# Patient Record
Sex: MALE | Race: WHITE | Employment: OTHER | URBAN - METROPOLITAN AREA
[De-identification: names, ages, dates, MRNs, and addresses within clinical notes are randomized per-mention and may not be internally consistent; named-entity substitution may affect disease eponyms.]

---

## 2021-07-20 ENCOUNTER — HOSPITAL ENCOUNTER (EMERGENCY)
Age: 86
Discharge: LEFT BEFORE DISCHARGE | End: 2021-07-20

## 2021-07-20 PROCEDURE — 75810000275 HC EMERGENCY DEPT VISIT NO LEVEL OF CARE

## 2022-04-12 ENCOUNTER — HOSPITAL ENCOUNTER (INPATIENT)
Age: 87
LOS: 3 days | Discharge: NURSING, REHAB OR LONG-TERM CARE | DRG: 177 | End: 2022-04-16
Attending: EMERGENCY MEDICINE | Admitting: HOSPITALIST
Payer: MEDICARE

## 2022-04-12 ENCOUNTER — APPOINTMENT (OUTPATIENT)
Dept: CT IMAGING | Age: 87
DRG: 177 | End: 2022-04-12
Attending: EMERGENCY MEDICINE
Payer: MEDICARE

## 2022-04-12 ENCOUNTER — APPOINTMENT (OUTPATIENT)
Dept: GENERAL RADIOLOGY | Age: 87
DRG: 177 | End: 2022-04-12
Attending: PHYSICIAN ASSISTANT
Payer: MEDICARE

## 2022-04-12 DIAGNOSIS — D72.829 LEUKOCYTOSIS, UNSPECIFIED TYPE: ICD-10-CM

## 2022-04-12 DIAGNOSIS — R06.00 DYSPNEA, UNSPECIFIED TYPE: ICD-10-CM

## 2022-04-12 DIAGNOSIS — Z51.5 ENCOUNTER FOR PALLIATIVE CARE: ICD-10-CM

## 2022-04-12 DIAGNOSIS — J90 PLEURAL EFFUSION: ICD-10-CM

## 2022-04-12 DIAGNOSIS — R53.83 FATIGUE, UNSPECIFIED TYPE: ICD-10-CM

## 2022-04-12 DIAGNOSIS — C34.92 ADENOCARCINOMA OF LEFT LUNG, STAGE 3 (HCC): ICD-10-CM

## 2022-04-12 DIAGNOSIS — R54 FRAILTY: ICD-10-CM

## 2022-04-12 DIAGNOSIS — J44.9 CHRONIC OBSTRUCTIVE PULMONARY DISEASE, UNSPECIFIED COPD TYPE (HCC): ICD-10-CM

## 2022-04-12 DIAGNOSIS — R09.02 HYPOXIA: Primary | ICD-10-CM

## 2022-04-12 DIAGNOSIS — J15.6 PNEUMONIA DUE TO GRAM-NEGATIVE BACTERIA (HCC): ICD-10-CM

## 2022-04-12 DIAGNOSIS — E86.0 DEHYDRATION: ICD-10-CM

## 2022-04-12 DIAGNOSIS — J44.9 COPD (CHRONIC OBSTRUCTIVE PULMONARY DISEASE) (HCC): ICD-10-CM

## 2022-04-12 LAB
ALBUMIN SERPL-MCNC: 2.3 G/DL (ref 3.2–4.6)
ALBUMIN/GLOB SERPL: 0.4 {RATIO} (ref 1.2–3.5)
ALP SERPL-CCNC: 91 U/L (ref 50–136)
ALT SERPL-CCNC: 61 U/L (ref 12–65)
ANION GAP SERPL CALC-SCNC: 7 MMOL/L (ref 7–16)
AST SERPL-CCNC: 19 U/L (ref 15–37)
ATRIAL RATE: 96 BPM
BASOPHILS # BLD: 0 K/UL (ref 0–0.2)
BASOPHILS NFR BLD: 0 % (ref 0–2)
BILIRUB SERPL-MCNC: 0.8 MG/DL (ref 0.2–1.1)
BUN SERPL-MCNC: 28 MG/DL (ref 8–23)
CALCIUM SERPL-MCNC: 10.5 MG/DL (ref 8.3–10.4)
CALCULATED P AXIS, ECG09: 97 DEGREES
CALCULATED R AXIS, ECG10: -61 DEGREES
CALCULATED T AXIS, ECG11: 93 DEGREES
CHLORIDE SERPL-SCNC: 102 MMOL/L (ref 98–107)
CO2 SERPL-SCNC: 32 MMOL/L (ref 21–32)
CREAT SERPL-MCNC: 0.9 MG/DL (ref 0.8–1.5)
DIAGNOSIS, 93000: NORMAL
DIFFERENTIAL METHOD BLD: ABNORMAL
EOSINOPHIL # BLD: 0 K/UL (ref 0–0.8)
EOSINOPHIL NFR BLD: 0 % (ref 0.5–7.8)
ERYTHROCYTE [DISTWIDTH] IN BLOOD BY AUTOMATED COUNT: 16.7 % (ref 11.9–14.6)
GLOBULIN SER CALC-MCNC: 5.4 G/DL (ref 2.3–3.5)
GLUCOSE SERPL-MCNC: 122 MG/DL (ref 65–100)
HCT VFR BLD AUTO: 41.8 % (ref 41.1–50.3)
HGB BLD-MCNC: 12.4 G/DL (ref 13.6–17.2)
IMM GRANULOCYTES # BLD AUTO: 0.1 K/UL (ref 0–0.5)
IMM GRANULOCYTES NFR BLD AUTO: 1 % (ref 0–5)
LACTATE SERPL-SCNC: 1.6 MMOL/L (ref 0.4–2)
LYMPHOCYTES # BLD: 0.4 K/UL (ref 0.5–4.6)
LYMPHOCYTES NFR BLD: 3 % (ref 13–44)
MAGNESIUM SERPL-MCNC: 2.3 MG/DL (ref 1.8–2.4)
MCH RBC QN AUTO: 25.2 PG (ref 26.1–32.9)
MCHC RBC AUTO-ENTMCNC: 29.7 G/DL (ref 31.4–35)
MCV RBC AUTO: 84.8 FL (ref 79.6–97.8)
MONOCYTES # BLD: 0.9 K/UL (ref 0.1–1.3)
MONOCYTES NFR BLD: 6 % (ref 4–12)
NEUTS SEG # BLD: 14.2 K/UL (ref 1.7–8.2)
NEUTS SEG NFR BLD: 91 % (ref 43–78)
NRBC # BLD: 0 K/UL (ref 0–0.2)
P-R INTERVAL, ECG05: 119 MS
PLATELET # BLD AUTO: 370 K/UL (ref 150–450)
PMV BLD AUTO: 9.9 FL (ref 9.4–12.3)
POTASSIUM SERPL-SCNC: 4.3 MMOL/L (ref 3.5–5.1)
PROT SERPL-MCNC: 7.7 G/DL (ref 6.3–8.2)
Q-T INTERVAL, ECG07: 338 MS
QRS DURATION, ECG06: 83 MS
QTC CALCULATION (BEZET), ECG08: 428 MS
RBC # BLD AUTO: 4.93 M/UL (ref 4.23–5.6)
SODIUM SERPL-SCNC: 141 MMOL/L (ref 136–145)
TROPONIN-HIGH SENSITIVITY: 14.9 PG/ML (ref 0–14)
TROPONIN-HIGH SENSITIVITY: 17.1 PG/ML (ref 0–14)
VENTRICULAR RATE, ECG03: 96 BPM
WBC # BLD AUTO: 15.7 K/UL (ref 4.3–11.1)

## 2022-04-12 PROCEDURE — 93005 ELECTROCARDIOGRAM TRACING: CPT | Performed by: EMERGENCY MEDICINE

## 2022-04-12 PROCEDURE — 96367 TX/PROPH/DG ADDL SEQ IV INF: CPT

## 2022-04-12 PROCEDURE — 71045 X-RAY EXAM CHEST 1 VIEW: CPT

## 2022-04-12 PROCEDURE — 74011000636 HC RX REV CODE- 636: Performed by: EMERGENCY MEDICINE

## 2022-04-12 PROCEDURE — 87040 BLOOD CULTURE FOR BACTERIA: CPT

## 2022-04-12 PROCEDURE — 96365 THER/PROPH/DIAG IV INF INIT: CPT

## 2022-04-12 PROCEDURE — 74011000250 HC RX REV CODE- 250: Performed by: EMERGENCY MEDICINE

## 2022-04-12 PROCEDURE — 99285 EMERGENCY DEPT VISIT HI MDM: CPT

## 2022-04-12 PROCEDURE — 85025 COMPLETE CBC W/AUTO DIFF WBC: CPT

## 2022-04-12 PROCEDURE — 83735 ASSAY OF MAGNESIUM: CPT

## 2022-04-12 PROCEDURE — 71260 CT THORAX DX C+: CPT

## 2022-04-12 PROCEDURE — 94762 N-INVAS EAR/PLS OXIMTRY CONT: CPT

## 2022-04-12 PROCEDURE — 83605 ASSAY OF LACTIC ACID: CPT

## 2022-04-12 PROCEDURE — 84484 ASSAY OF TROPONIN QUANT: CPT

## 2022-04-12 PROCEDURE — 74011250636 HC RX REV CODE- 250/636: Performed by: EMERGENCY MEDICINE

## 2022-04-12 PROCEDURE — 74011000258 HC RX REV CODE- 258: Performed by: EMERGENCY MEDICINE

## 2022-04-12 PROCEDURE — 80053 COMPREHEN METABOLIC PANEL: CPT

## 2022-04-12 RX ORDER — SODIUM CHLORIDE 0.9 % (FLUSH) 0.9 %
5-10 SYRINGE (ML) INJECTION EVERY 8 HOURS
Status: DISCONTINUED | OUTPATIENT
Start: 2022-04-12 | End: 2022-04-16 | Stop reason: HOSPADM

## 2022-04-12 RX ORDER — SODIUM CHLORIDE 0.9 % (FLUSH) 0.9 %
5-10 SYRINGE (ML) INJECTION AS NEEDED
Status: DISCONTINUED | OUTPATIENT
Start: 2022-04-12 | End: 2022-04-16 | Stop reason: HOSPADM

## 2022-04-12 RX ORDER — SODIUM CHLORIDE 0.9 % (FLUSH) 0.9 %
10 SYRINGE (ML) INJECTION
Status: COMPLETED | OUTPATIENT
Start: 2022-04-12 | End: 2022-04-12

## 2022-04-12 RX ADMIN — Medication 10 ML: at 21:31

## 2022-04-12 RX ADMIN — CEFTRIAXONE 2 G: 2 INJECTION, POWDER, FOR SOLUTION INTRAMUSCULAR; INTRAVENOUS at 23:05

## 2022-04-12 RX ADMIN — AZITHROMYCIN MONOHYDRATE 500 MG: 500 INJECTION, POWDER, LYOPHILIZED, FOR SOLUTION INTRAVENOUS at 23:53

## 2022-04-12 RX ADMIN — SODIUM CHLORIDE 100 ML: 9 INJECTION, SOLUTION INTRAVENOUS at 21:31

## 2022-04-12 RX ADMIN — SODIUM CHLORIDE 250 ML/HR: 900 INJECTION, SOLUTION INTRAVENOUS at 23:09

## 2022-04-12 RX ADMIN — IOPAMIDOL 100 ML: 755 INJECTION, SOLUTION INTRAVENOUS at 21:31

## 2022-04-12 RX ADMIN — SODIUM CHLORIDE, PRESERVATIVE FREE 10 ML: 5 INJECTION INTRAVENOUS at 22:00

## 2022-04-12 NOTE — ED NOTES
Patient is a 59-year-old male with history of lung cancer chronically on 3 L who presents with worsening shortness of breath for 2 weeks. In triage oxygen saturation 85% on 6 L. Mild increased work of breathing. I do not hear any breath sounds on the left. Decreased breath sounds on right. Patient evaluated initially in triage. Rapid Medical Evaluation was conducted and necessary orders have been placed. I have performed a medical screening exam.  Care will now be transferred to the provider in the back of the emergency department.   JAVIER Bedolla 3:19 PM

## 2022-04-13 PROBLEM — J90 PLEURAL EFFUSION: Status: ACTIVE | Noted: 2022-04-13

## 2022-04-13 PROBLEM — J15.6 PNEUMONIA DUE TO GRAM-NEGATIVE BACTERIA (HCC): Status: ACTIVE | Noted: 2022-04-13

## 2022-04-13 PROBLEM — E86.0 DEHYDRATION: Status: ACTIVE | Noted: 2022-04-13

## 2022-04-13 PROBLEM — R53.1 WEAKNESS GENERALIZED: Status: ACTIVE | Noted: 2022-04-13

## 2022-04-13 PROBLEM — J18.9 PNEUMONIA: Status: ACTIVE | Noted: 2022-04-13

## 2022-04-13 PROBLEM — C34.92 ADENOCARCINOMA OF LEFT LUNG, STAGE 3 (HCC): Status: ACTIVE | Noted: 2022-04-13

## 2022-04-13 PROCEDURE — 74011250637 HC RX REV CODE- 250/637: Performed by: HOSPITALIST

## 2022-04-13 PROCEDURE — 94762 N-INVAS EAR/PLS OXIMTRY CONT: CPT

## 2022-04-13 PROCEDURE — 74011000250 HC RX REV CODE- 250: Performed by: HOSPITALIST

## 2022-04-13 PROCEDURE — 97165 OT EVAL LOW COMPLEX 30 MIN: CPT

## 2022-04-13 PROCEDURE — 97535 SELF CARE MNGMENT TRAINING: CPT

## 2022-04-13 PROCEDURE — 97530 THERAPEUTIC ACTIVITIES: CPT

## 2022-04-13 PROCEDURE — 92610 EVALUATE SWALLOWING FUNCTION: CPT

## 2022-04-13 PROCEDURE — 65270000029 HC RM PRIVATE

## 2022-04-13 PROCEDURE — 99223 1ST HOSP IP/OBS HIGH 75: CPT | Performed by: INTERNAL MEDICINE

## 2022-04-13 PROCEDURE — 77010033678 HC OXYGEN DAILY

## 2022-04-13 PROCEDURE — 97162 PT EVAL MOD COMPLEX 30 MIN: CPT

## 2022-04-13 PROCEDURE — 76450000000

## 2022-04-13 PROCEDURE — 94640 AIRWAY INHALATION TREATMENT: CPT

## 2022-04-13 PROCEDURE — 86580 TB INTRADERMAL TEST: CPT | Performed by: HOSPITALIST

## 2022-04-13 PROCEDURE — 74011000258 HC RX REV CODE- 258: Performed by: HOSPITALIST

## 2022-04-13 PROCEDURE — 74011000250 HC RX REV CODE- 250: Performed by: EMERGENCY MEDICINE

## 2022-04-13 PROCEDURE — 74011250636 HC RX REV CODE- 250/636: Performed by: HOSPITALIST

## 2022-04-13 PROCEDURE — 99222 1ST HOSP IP/OBS MODERATE 55: CPT | Performed by: INTERNAL MEDICINE

## 2022-04-13 PROCEDURE — 94664 DEMO&/EVAL PT USE INHALER: CPT

## 2022-04-13 PROCEDURE — 74011000250 HC RX REV CODE- 250: Performed by: INTERNAL MEDICINE

## 2022-04-13 RX ORDER — SODIUM CHLORIDE 9 MG/ML
75 INJECTION, SOLUTION INTRAVENOUS CONTINUOUS
Status: DISCONTINUED | OUTPATIENT
Start: 2022-04-13 | End: 2022-04-15

## 2022-04-13 RX ORDER — ONDANSETRON 2 MG/ML
4 INJECTION INTRAMUSCULAR; INTRAVENOUS
Status: DISCONTINUED | OUTPATIENT
Start: 2022-04-13 | End: 2022-04-16 | Stop reason: HOSPADM

## 2022-04-13 RX ORDER — ONDANSETRON 4 MG/1
4 TABLET, ORALLY DISINTEGRATING ORAL
Status: DISCONTINUED | OUTPATIENT
Start: 2022-04-13 | End: 2022-04-16 | Stop reason: HOSPADM

## 2022-04-13 RX ORDER — TAMSULOSIN HYDROCHLORIDE 0.4 MG/1
0.4 CAPSULE ORAL DAILY
Status: DISCONTINUED | OUTPATIENT
Start: 2022-04-13 | End: 2022-04-16 | Stop reason: HOSPADM

## 2022-04-13 RX ORDER — ACETAMINOPHEN 650 MG/1
650 SUPPOSITORY RECTAL
Status: DISCONTINUED | OUTPATIENT
Start: 2022-04-13 | End: 2022-04-16 | Stop reason: HOSPADM

## 2022-04-13 RX ORDER — ALBUTEROL SULFATE 0.83 MG/ML
2.5 SOLUTION RESPIRATORY (INHALATION)
Status: DISCONTINUED | OUTPATIENT
Start: 2022-04-13 | End: 2022-04-16 | Stop reason: HOSPADM

## 2022-04-13 RX ORDER — LEVALBUTEROL INHALATION SOLUTION 0.63 MG/3ML
0.63 SOLUTION RESPIRATORY (INHALATION)
Status: DISCONTINUED | OUTPATIENT
Start: 2022-04-13 | End: 2022-04-16 | Stop reason: HOSPADM

## 2022-04-13 RX ORDER — SODIUM CHLORIDE FOR INHALATION 3 %
4 VIAL, NEBULIZER (ML) INHALATION
Status: DISCONTINUED | OUTPATIENT
Start: 2022-04-13 | End: 2022-04-16 | Stop reason: HOSPADM

## 2022-04-13 RX ORDER — ALFUZOSIN HYDROCHLORIDE 10 MG/1
TABLET, EXTENDED RELEASE ORAL DAILY
COMMUNITY

## 2022-04-13 RX ORDER — MONTELUKAST SODIUM 10 MG/1
10 TABLET ORAL DAILY
COMMUNITY

## 2022-04-13 RX ORDER — IPRATROPIUM BROMIDE AND ALBUTEROL SULFATE 2.5; .5 MG/3ML; MG/3ML
3 SOLUTION RESPIRATORY (INHALATION)
Status: DISCONTINUED | OUTPATIENT
Start: 2022-04-13 | End: 2022-04-13

## 2022-04-13 RX ORDER — ENOXAPARIN SODIUM 100 MG/ML
40 INJECTION SUBCUTANEOUS DAILY
Status: DISCONTINUED | OUTPATIENT
Start: 2022-04-13 | End: 2022-04-16 | Stop reason: HOSPADM

## 2022-04-13 RX ORDER — PREDNISONE 10 MG/1
40 TABLET ORAL
COMMUNITY
End: 2022-04-16

## 2022-04-13 RX ORDER — PANTOPRAZOLE SODIUM 40 MG/1
40 TABLET, DELAYED RELEASE ORAL DAILY
Status: DISCONTINUED | OUTPATIENT
Start: 2022-04-13 | End: 2022-04-16 | Stop reason: HOSPADM

## 2022-04-13 RX ORDER — POLYETHYLENE GLYCOL 3350 17 G/17G
17 POWDER, FOR SOLUTION ORAL DAILY PRN
Status: DISCONTINUED | OUTPATIENT
Start: 2022-04-13 | End: 2022-04-16 | Stop reason: HOSPADM

## 2022-04-13 RX ORDER — ACETAMINOPHEN 325 MG/1
650 TABLET ORAL
Status: DISCONTINUED | OUTPATIENT
Start: 2022-04-13 | End: 2022-04-16 | Stop reason: HOSPADM

## 2022-04-13 RX ORDER — ASPIRIN 81 MG/1
81 TABLET ORAL DAILY
Status: DISCONTINUED | OUTPATIENT
Start: 2022-04-13 | End: 2022-04-16 | Stop reason: HOSPADM

## 2022-04-13 RX ADMIN — IPRATROPIUM BROMIDE AND ALBUTEROL SULFATE 3 ML: .5; 3 SOLUTION RESPIRATORY (INHALATION) at 07:29

## 2022-04-13 RX ADMIN — SODIUM CHLORIDE SOLN NEBU 3% 4 ML: 3 NEBU SOLN at 20:42

## 2022-04-13 RX ADMIN — PANTOPRAZOLE SODIUM 40 MG: 40 TABLET, DELAYED RELEASE ORAL at 08:57

## 2022-04-13 RX ADMIN — LEVALBUTEROL HYDROCHLORIDE 0.63 MG: 0.63 SOLUTION RESPIRATORY (INHALATION) at 20:42

## 2022-04-13 RX ADMIN — SODIUM CHLORIDE, PRESERVATIVE FREE 10 ML: 5 INJECTION INTRAVENOUS at 22:05

## 2022-04-13 RX ADMIN — LEVALBUTEROL HYDROCHLORIDE 0.63 MG: 0.63 SOLUTION RESPIRATORY (INHALATION) at 15:28

## 2022-04-13 RX ADMIN — PIPERACILLIN AND TAZOBACTAM 3.38 G: 3; .375 INJECTION, POWDER, LYOPHILIZED, FOR SOLUTION INTRAVENOUS at 22:04

## 2022-04-13 RX ADMIN — SODIUM CHLORIDE 75 ML/HR: 900 INJECTION, SOLUTION INTRAVENOUS at 06:31

## 2022-04-13 RX ADMIN — ENOXAPARIN SODIUM 40 MG: 40 INJECTION SUBCUTANEOUS at 08:57

## 2022-04-13 RX ADMIN — ASPIRIN 81 MG: 81 TABLET ORAL at 08:57

## 2022-04-13 RX ADMIN — SODIUM CHLORIDE 75 ML/HR: 900 INJECTION, SOLUTION INTRAVENOUS at 22:11

## 2022-04-13 RX ADMIN — PIPERACILLIN AND TAZOBACTAM 3.38 G: 3; .375 INJECTION, POWDER, LYOPHILIZED, FOR SOLUTION INTRAVENOUS at 05:22

## 2022-04-13 RX ADMIN — TAMSULOSIN HYDROCHLORIDE 0.4 MG: 0.4 CAPSULE ORAL at 08:57

## 2022-04-13 RX ADMIN — SODIUM CHLORIDE, PRESERVATIVE FREE 5 ML: 5 INJECTION INTRAVENOUS at 12:22

## 2022-04-13 RX ADMIN — PIPERACILLIN AND TAZOBACTAM 3.38 G: 3; .375 INJECTION, POWDER, LYOPHILIZED, FOR SOLUTION INTRAVENOUS at 12:18

## 2022-04-13 RX ADMIN — Medication 5 UNITS: at 08:57

## 2022-04-13 RX ADMIN — SODIUM CHLORIDE, PRESERVATIVE FREE 10 ML: 5 INJECTION INTRAVENOUS at 05:21

## 2022-04-13 RX ADMIN — LEVALBUTEROL HYDROCHLORIDE 0.63 MG: 0.63 SOLUTION RESPIRATORY (INHALATION) at 13:32

## 2022-04-13 NOTE — ED NOTES
Pt provided snack tray and drink at this time. Pt tolerating well. Pt resp even and unlabored wile sitting up on the side of the bed. Family at bedside. Pt eval cont.

## 2022-04-13 NOTE — ACP (ADVANCE CARE PLANNING)
Advance care planninginitial encounter      Face to face time - 20 minutes face-to-face time spent discussion the care       Patient has decision making capacity   [X] Yes  [ ] NO    Names of POA/surrogate decision maker when patient is altered:  Step son    Other members present in the meeting :   Step son      [] Full code- full aggressive medical and surgical interventions, including intubations, resuscitations, pressors, artificial tube feeding  [ ] DO NOT RESUSCITATE -okay to intubate,  and other aggressive medical and surgical intervention   Perrie.Gulling ] Do Not resuscitate, DO NOT INTUBATE, but okay for other aggressive medical and surgical intervention   [ ] DNR/DNI, hospice, comfort focus care to maximize patient's quality of life  [ ] DNR/DNI, strict comfort care ONLY        Further discussion regarding principle disease process. prognosis, plans of care, and treatment goals: We discussed that since he does not desire chemo, and with disease progression, he should consider Hospice Care.  Will consult Palliative Care for goals of treatment        Chava Wilkinson MD

## 2022-04-13 NOTE — PROGRESS NOTES
MSN, CM:  Spoke with patient this AM about discharge planning. Patient lives alone in own mobile home with 3 steps for entrance. Patient has a step son \"Jung\" that lives locally. Patient is independent with all ADL's and requires no equipment for ambulation. Patient requries home oxygen at 4L NC but patient does not recall oxygen company. Patient denies any HH or rehab in the past.  Patient confirms PCP is Dr. Maritza Tinoco and patient's step son drives patient to all appointments. PT and OT consulted for evaluation and recommendations. Case Management will continue to follow for any discharge needs. Care Management Interventions  PCP Verified by CM: Yes (Dr. Maritza Tinoco)  Mode of Transport at Discharge:  Other (see comment) (family to transport)  Health Maintenance Reviewed: Yes  Physical Therapy Consult: Yes  Occupational Therapy Consult: Yes  Support Systems: Child(meir)  Confirm Follow Up Transport: Family  Freedom of Choice List was Provided with Basic Dialogue that Supports the Patient's Individualized Plan of Care/Goals, Treatment Preferences and Shares the Quality Data Associated with the Providers?: Yes  Discharge Location  Patient Expects to be Discharged to[de-identified] Unable to determine at this time

## 2022-04-13 NOTE — PROGRESS NOTES
Patient is an 59-year-old male with history of stage III adenocarcinoma status post radiation, COPD, HLD, BPH, AAA admitted on 4/13 with acute on chronic hypoxic respiratory failure in view of left sided pleural effusion and gram-negative bacterial pneumonia with left lung consolidation. Patient was empirically started on Zosyn for gram-negative and anaerobic coverage. Bronchodilators have been initiated  [facility] pulmonary assistance. Patient is a DNR  I agree with rest of the plan of care as documented by admitting provider earlier this morning. Patient not billed for this visit.

## 2022-04-13 NOTE — CONSULTS
History and Physical Initial Visit NOTE           2022    Katerin Brown                        Date of Admission:  2022    The patient's chart is reviewed and the patient is discussed with the staff. Subjective:     Patient is a 80 y.o.  male, PMH  COPD, HLD, stage III adenocarcinoma of left lung, BPH, and AAA, seen and evaluated at the request of Dr. Radha Bryant for pleural effusion. The patient presented to the ED overnight after being experiencing worsening shortness of breath for the past three weeks. The patient is seen by Dr. Cruz Bryson in [facility] for COPD and Dr. Naomi Mak in [facility] for non small cell lung cancer, DELORES, adenocarcinoma, Stage IIA by EBUS, stage IIIA by PET scan ([id number]), not a candidate for resection, s/p single modality radiation. The patient had been previously treated for possible radiation pneumonitis with prednisone and Augmentin but has continued to have worsening cough and shortness of breath. The patient was brought to the ED by his stepson who was concerned about the patient's worsening lethargy as the patient is normally as active as possible at home. On admission WBC 15.7, albumin 2.3, Trop 14.9, lactic acid 1.6, CT chest revealed left pleural effusion, airspace consolidation of left lung, and emphysematous changes. Review of Systems  A comprehensive review of systems was negative except for that written in the HPI. Prior to Admission Medications   Prescriptions Last Dose Informant Patient Reported? Taking? albuterol (PROVENTIL HFA, VENTOLIN HFA, PROAIR HFA) 90 mcg/actuation inhaler Unknown at Unknown time  No No   Si-4 puffs before exercise and every 3-6 hrs as needed. alfuzosin SR (UROXATRAL) 10 mg SR tablet 2022 at Unknown time  Yes Yes   Sig: Take  by mouth daily. amoxicillin (AMOXIL) 500 mg capsule Unknown at Unknown time  No No   Sig: Take 1 Cap by mouth two (2) times a day.    aspirin delayed-release 81 mg tablet Unknown at Unknown time  Yes No   Sig: Take  by mouth daily. ipratropium (ATROVENT) 0.06 % nasal spray Unknown at Unknown time  No No   Si Sprays by Both Nostrils route four (4) times daily. Indications: PERENNIAL ALLERGIC RHINITIS   levocetirizine (XYZAL) 5 mg tablet 2022 at Unknown time  No Yes   Sig: Take 1 Tab by mouth daily. montelukast (SINGULAIR) 10 mg tablet 2022 at Unknown time  Yes Yes   Sig: Take 10 mg by mouth daily. pantoprazole (PROTONIX) 40 mg tablet 2022 at Unknown time  No Yes   Sig: Take 1 Tab by mouth daily. Indications: GASTROESOPHAGEAL REFLUX   predniSONE (DELTASONE) 10 mg tablet 2022 at Unknown time  Yes Yes   Sig: Take 40 mg/kg by mouth daily (with breakfast). sildenafil citrate (VIAGRA) 100 mg tablet Unknown at Unknown time  No No   Sig: Take 1 Tab by mouth as needed. tamsulosin (FLOMAX) 0.4 mg capsule Unknown at Unknown time  No No   Sig: Take 1 Cap by mouth daily.       Facility-Administered Medications: None     Past Medical History:   Diagnosis Date    Abdominal aneurysm without mention of rupture 2015    Actinic keratosis     Adenocarcinoma of lung, stage 3, left [facility] [address]     Benign localized hyperplasia of prostate with urinary obstruction and other lower urinary tract symptoms (LUTS)(600.21) 2015    Cervicalgia     Chronic airway obstruction, not elsewhere classified 2015    Cramp of limb 2015    Esophageal reflux     Impotence of organic origin 2015    Kyphosis (acquired) (postural) 2015    Other symptoms involving respiratory system and chest 2015    Other voice and resonance disorders     Pure hypercholesterolemia 2015    Unspecified constipation     Unspecified disorders of arteries and arterioles 2015    Unspecified hemorrhoids without mention of complication 3/99/6008    Unspecified tinnitus 2015    Urinary frequency      Past Surgical History:   Procedure Laterality Date    HX CARPAL TUNNEL RELEASE Bilateral     HX HERNIA REPAIR      HIATAL2011    HX OTHER SURGICAL      AORTIC ANEURSYM STENT PLACEMENT- DR BENAVIDES    HX TONSILLECTOMY      HX TOTAL COLECTOMY      DR BENAVIDES     Social History     Socioeconomic History    Marital status:      Spouse name: Not on file    Number of children: Not on file    Years of education: Not on file    Highest education level: Not on file   Occupational History    Not on file   Tobacco Use    Smoking status: Former Smoker     Quit date: 2014     Years since quittin.0    Smokeless tobacco: Not on file   Substance and Sexual Activity    Alcohol use: No    Drug use: Not on file    Sexual activity: Not on file   Other Topics Concern    Not on file   Social History Narrative    Not on file     Social Determinants of Health     Financial Resource Strain:     Difficulty of Paying Living Expenses: Not on file   Food Insecurity:     Worried About [address] in the Last Year: Not on file    [address] in the Last Year: Not on file   Transportation Needs:     Lack of Transportation (Medical): Not on file    Lack of Transportation (Non-Medical):  Not on file   Physical Activity:     Days of Exercise per Week: Not on file    Minutes of Exercise per Session: Not on file   Stress:     Feeling of Stress : Not on file   Social Connections:     Frequency of Communication with Friends and Family: Not on file    Frequency of Social Gatherings with Friends and Family: Not on file    Attends Quaker Services: Not on file    Active Member of Clubs or Organizations: Not on file    Attends Club or Organization Meetings: Not on file    Marital Status: Not on file   Intimate Partner Violence:     Fear of Current or Ex-Partner: Not on file    Emotionally Abused: Not on file    Physically Abused: Not on file    Sexually Abused: Not on file   Housing Stability:     Unable to Pay for Housing in the Last Year: Not on file  Number of Places Lived in the Last Year: Not on file    Unstable Housing in the Last Year: Not on file     Family History   Problem Relation Age of Onset    Elevated Lipids Mother     No Known Problems Sister     Other Sister         STOMACH PROBLEMS    Heart Disease Father 79        MI     Allergies   Allergen Reactions    Actonel [Risedronate] Nausea Only     Current Facility-Administered Medications   Medication Dose Route Frequency    tamsulosin (FLOMAX) capsule 0.4 mg  0.4 mg Oral DAILY    pantoprazole (PROTONIX) tablet 40 mg  40 mg Oral DAILY    aspirin delayed-release tablet 81 mg  81 mg Oral DAILY    acetaminophen (TYLENOL) tablet 650 mg  650 mg Oral Q6H PRN    Or    acetaminophen (TYLENOL) suppository 650 mg  650 mg Rectal Q6H PRN    polyethylene glycol (MIRALAX) packet 17 g  17 g Oral DAILY PRN    ondansetron (ZOFRAN ODT) tablet 4 mg  4 mg Oral Q8H PRN    Or    ondansetron (ZOFRAN) injection 4 mg  4 mg IntraVENous Q6H PRN    enoxaparin (LOVENOX) injection 40 mg  40 mg SubCUTAneous DAILY    0.9% sodium chloride infusion  75 mL/hr IntraVENous CONTINUOUS    tuberculin injection 5 Units  5 Units IntraDERMal ONCE    piperacillin-tazobactam (ZOSYN) 3.375 g in 0.9% sodium chloride (MBP/ADV) 100 mL MBP  3.375 g IntraVENous Q8H    albuterol (PROVENTIL VENTOLIN) nebulizer solution 2.5 mg  2.5 mg Nebulization Q4H PRN    levalbuterol (XOPENEX) nebulizer soln 0.63 mg/3 mL  0.63 mg Nebulization QID RT    sodium chloride (NS) flush 5-10 mL  5-10 mL IntraVENous Q8H    sodium chloride (NS) flush 5-10 mL  5-10 mL IntraVENous PRN     Objective:   Blood pressure 107/64, pulse 77, temperature 97.4 °F (36.3 °C), resp. rate 20, height 5' 8\" (1.727 m), weight 127 lb 8 oz (57.8 kg), SpO2 94 %.      Intake/Output Summary (Last 24 hours) at 4/13/2022 1050  Last data filed at 4/13/2022 0857  Gross per 24 hour   Intake 503 ml   Output 800 ml   Net -297 ml     PHYSICAL EXAM   Constitutional:  the patient is thin and frail and in no acute distress  EENMT:  Sclera clear, pupils equal, oral mucosa moist  Respiratory: On 3L O2 NC, decreased left, no wheezing  Cardiovascular:  RRR without M,G,R  Gastrointestinal: soft and non-tender; with positive bowel sounds. Musculoskeletal: warm without cyanosis. There is no lower extremity edema. Skin:  no jaundice or rashes, no wounds   Neurologic: no gross neuro deficits     Psychiatric:  alert and oriented x 3    CXR:  4/12/22      CT Chest:4/12/22      Recent Labs     04/12/22 1819 04/12/22 1631 04/12/22  1542   WBC  --   --  15.7*   HGB  --   --  12.4*   HCT  --   --  41.8   PLT  --   --  370   LAC  --  1.6  --    NA  --   --  141   K  --   --  4.3   CL  --   --  102   CO2  --   --  32   GLU  --   --  122*   BUN  --   --  28*   CREA  --   --  0.90   MG  --   --  2.3   CA  --   --  10.5*   ALB  --   --  2.3*   AST  --   --  19   ALT  --   --  61   AP  --   --  91   TROPHS 17.1*  --  14.9*     ECHO: No results found for this or any previous visit.      Results     Procedure Component Value Units Date/Time    CULTURE, BLOOD [[id number]] Collected: 04/12/22 1819    Order Status: Completed Specimen: Blood Updated: 04/13/22 0847     Special Requests: --        LEFT  ARM       Culture result: NO GROWTH AFTER 13 HOURS       CULTURE, BLOOD [[id number]] Collected: 04/12/22 1631    Order Status: Completed Specimen: Blood Updated: 04/13/22 0847     Special Requests: --        LEFT  Antecubital       Culture result: NO GROWTH AFTER 14 HOURS           Inpat Anti-Infectives (From admission, onward)     Start     Ordered Stop    04/13/22 0500  piperacillin-tazobactam (ZOSYN) 3.375 g in 0.9% sodium chloride (MBP/ADV) 100 mL MBP  3.375 g,   IntraVENous,   EVERY 8 HOURS         04/13/22 0420 04/18/22 0453              Assessment and Plan:  (Medical Decision Making)   Principal Problem:    Pneumonia due to gram-negative bacteria ([address] (4/13/2022)  --On Zosyn day 1, blood cultures pending, will continue    Active Problems:    COPD (chronic obstructive pulmonary disease) ([facility] (1/21/2015)  --Xopenex here, only on albuterol at home. Will continue evaluating need for additional therapies      Adenocarcinoma of left lung, stage 3 ([facility] (4/13/2022)  --Followed by Dr. Nina Fernández in [facility], s/p single modality radiation      Pleural effusion (4/13/2022)  --Per CT chest, ?needs bronch, ?thoracentesis      Dehydration (4/13/2022)  --Likely due to poor oral intake      Weakness generalized (4/13/2022)  --Palliative Care following     DNR    More than 50% of the time documented was spent in face-to-face contact with the patient and in the care of the patient on the floor/unit where the patient is located. Thank you very much for this referral.  We appreciate the opportunity to participate in this patient's care. Will follow along with above stated plan. Kaiser Katz NP     I have spoken with and examined the patient. I agree with the above assessment and plan as documented.     Stacey Rangel, on 3L NC, says he feels already better  Lungs:  Diminished   Heart:  RRR with no Murmur/Rubs/Gallops  Abd:soft Nt  Ext: no LE edema    Chest CT reviewed L consolidation consistent with PNA ,small effusion -seems small but can check with US if can tap   Will add mucous clearing measures   Will need repeat follow up CT scan ,would hold on brnch at this point      Melanie Parry MD

## 2022-04-13 NOTE — H&P
[facility] History and Physical       Name:  Oran Peabody. Gambrell  Age:86 y.o. Sex:male   :  1935    MRN:  [id number]   PCP:  Kennedy Roberson MD    ER Arrival date and time:  2022  3:22 PM   Chief Complaint: \"Patient is a 80-year-old male with history of lung cancer chronically on 3 L who presents with worsening shortness of breath for 2 weeks. \"    Reason for Admission:   Pneumonia [J18.9] - 79 yo man, stage 3 adenocarcinoma lung, here with increased SOB and weakness, symptoms worse despite outpatient Rx, found to have persistent pneumonia and pleural effusion, possibly malignant. Hospitalist discussed with patient that even with admission and treatment of infection/dehydration, he may not get much better as symptoms may represent cancer progression    Assessment & Plan: Active Problems:    COPD (chronic obstructive pulmonary disease) ([facility] [address] (2015)          Adenocarcinoma of left lung, stage 3 ([facility] [address] (2022)          Pneumonia (2022)          Pleural effusion (2022)         Dehydration (2022)          Weakness generalized (2022)            PLAN:  1) Admit med bed, INPATIENT STATUS due to medical complexity, need for close cardiopulmonary monitoring given initial unstable vitals signs (tachycardia and hypotension)  & need for IV medication  2) He has been tried on outpatient Abx and steroids without improvement. Will continue IV abx as started in ER  3) IVF for dehydration  4) Patient would like 2nd opinion about his pulmonary issues, May need thoracentesis for effusion. Will consult Pulmonary  5)  Hospitalist discussed with patient and family that even with treatment of infection, his symptoms may not improve much due to chronicity and advancement of cancer. 6) Will consult case management to assist with discharge planning.  Patient lives alone and with cancer progression, he may need placement  7) Palliative care consult  8) Will review home meds and continue as appropriate        Disposition/Expected LOS: 3-4  Diet: DIET ADULT  VTE ppx: lovenox  Code status: No Order  Surrogate decision-maker: step son      History of Presenting Illness:     Nikos Patel. Leoncio Chin is a 80 y.o. male with medical history of adenocarcinoma lung who presented to ED with with worsening shortness of breath over the past 3 weeks. He was seen by his pulmonologist Antonio Perez in [place] 1 week ago and placed on oxygen due to exertional hypoxia. He was also placed on prednisone and Augmentin for possible radiation pneumonitis causing worsening shortness of breath and cough. He reports persistent cough with clear sputum and worsening weakness. Step son brought him to the emergency department today because he was unable to get out of bed due to weakness and shortness of breath. He lives alone. He normally goes to [facility], but wanted to come here because he states we treat him better. No fever or chest pain. Workup in ER included CXR with dictation similar to outpatient CXR from week before (Hyperexpansion of the right lung . ... and near Complete whiteout of the left hemithorax most likely representing atelectasis  and a probable component of pneumonia and pleural effusion. \")     No PTX on CT. worsening debris in bronchus and pleural effusion with hypoxia. WBC elevated 15.7K (but he has been on outpatient steroids) , lactic acid 1.6  He has been started on IV abx and hospitalist asked to admit. Family at bedside mentions that he lives alone, and they are concerned for his safety as he has been having trouble with weakness and ADLs    Review of Systems:  A 14 point review of systems was taken and pertinent positive as per HPI. Poor appetite  . With regards to his cancer, diagnosed last year, completed XRT. He did not want chemo. Not a candidate for resection.  Follows with Sonia in [place]      Past Medical History:   Diagnosis Date    Abdominal aneurysm without mention of rupture 2015    Actinic keratosis     Adenocarcinoma of lung, stage 3, left ([facility] [address]     Benign localized hyperplasia of prostate with urinary obstruction and other lower urinary tract symptoms (LUTS)(600.21) 2015    Cervicalgia     Chronic airway obstruction, not elsewhere classified 2015    Cramp of limb 2015    Esophageal reflux     Impotence of organic origin 2015    Kyphosis (acquired) (postural) 2015    Other symptoms involving respiratory system and chest 2015    Other voice and resonance disorders     Pure hypercholesterolemia 2015    Unspecified constipation     Unspecified disorders of arteries and arterioles 2015    Unspecified hemorrhoids without mention of complication     Unspecified tinnitus 2015    Urinary frequency        Past Surgical History:   Procedure Laterality Date    HX CARPAL TUNNEL RELEASE Bilateral     HX HERNIA REPAIR      HIATAL2011    HX OTHER SURGICAL      AORTIC ANEURSYM STENT PLACEMENT- DR BENAVIDES    HX TONSILLECTOMY      HX TOTAL COLECTOMY      DR Sofia Console       Family History : reviewed  Family History   Problem Relation Age of Onset    Elevated Lipids Mother     No Known Problems Sister     Other Sister         STOMACH PROBLEMS    Heart Disease Father 79        MI        Social History     Tobacco Use    Smoking status: Former Smoker     Quit date: 2014     Years since quittin.0    Smokeless tobacco: Not on file   Substance Use Topics    Alcohol use: No       Allergies   Allergen Reactions    Actonel [Risedronate] Nausea Only       Immunization History   Administered Date(s) Administered    COVID-19, Pfizer Purple top, DILUTE for use, 12+ yrs, 30mcg/0.3mL dose 2021, 2021    Influenza Vaccine 10/01/2014, 2015    Pneumococcal Vaccine (Unspecified Type) 2007    Td 2010         PTA Medications:  Current Outpatient Medications   Medication Instructions    albuterol (PROVENTIL HFA, VENTOLIN HFA, PROAIR HFA) 90 mcg/actuation inhaler 1-4 puffs before exercise and every 3-6 hrs as needed.  amoxicillin (AMOXIL) 500 mg, Oral, 2 TIMES DAILY    aspirin delayed-release 81 mg tablet DAILY    ipratropium (ATROVENT) 0.06 % nasal spray 2 Sprays, Both Nostrils, 4 TIMES DAILY    levocetirizine (XYZAL) 5 mg, Oral, DAILY    pantoprazole (PROTONIX) 40 mg, Oral, DAILY    sildenafil citrate (VIAGRA) 100 mg, Oral, AS NEEDED    tamsulosin (FLOMAX) 0.4 mg, Oral, DAILY       Objective:     Patient Vitals for the past 24 hrs:   Temp Pulse Resp BP SpO2   04/13/22 0030    126/70    04/13/22 0028  98      04/12/22 2308  91 22 95/60 90 %   04/12/22 2238  96  101/65    04/12/22 2223  86  109/60 91 %   04/12/22 2145  91   98 %   04/12/22 2105  84  (!) 94/54    04/12/22 2004  90 26 107/74    04/12/22 1734  87 24 103/72 96 %   04/12/22 1704  86 26 (!) 99/56    04/12/22 1634  89 27 103/66 97 %   04/12/22 1600  89 26 (!) 91/55 96 %   04/12/22 1540  (!) 106 20 93/64 91 %   04/12/22 1516 98.4 °F (36.9 °C) (!) 109 18 99/65 (!) 87 %       Oxygen Therapy  O2 Sat (%): 90 % (04/12/22 2308)  Pulse via Oximetry: 87 beats per minute (04/12/22 2308)    Body mass index is 19.39 kg/m². Physical Exam:    General:  Alert and oriented x 3, No acute distress, speaking in full sentences, cachetic  HEENT:  Pupils equal and reactive to light and accommodation, oropharynx is clear and dehydrated. edentulous  Neck:   Supple, no lymphadenopathy, no JVD   Lungs:  Diminished breath sounds left lung, rhonchi right   CV:   Regular rate, regular rhythm, with normal S1 and S2   Abdomen:  Soft, nontender, nondistended, normoactive bowel sounds   Extremities:  No cyanosis clubbing or edema   Neuro:  Nonfocal, A&O x3   Psych:  Normal mood and affect       Data Reviewed: I have reviewed all labs, meds, and studies.       Recent Results (from the past 24 hour(s))   EKG, 12 LEAD, INITIAL    Collection Time: 04/12/22  3:33 PM   Result Value Ref Range    Ventricular Rate 96 BPM    Atrial Rate 96 BPM    P-R Interval 119 ms    QRS Duration 83 ms    Q-T Interval 338 ms    QTC Calculation (Bezet) 428 ms    Calculated P Axis 97 degrees    Calculated R Axis -61 degrees    Calculated T Axis 93 degrees    Diagnosis       Sinus rhythm  Borderline short [place] interval  Abnormal R-wave progression, late transition  Inferior infarct, old      Confirmed by ST RALF SABA MD (), PRACHI MCCRARY ([id number]) on 4/12/2022 8:31:27 PM     CBC WITH AUTOMATED DIFF    Collection Time: 04/12/22  3:42 PM   Result Value Ref Range    WBC 15.7 (H) 4.3 - 11.1 K/uL    RBC 4.93 4.23 - 5.6 M/uL    HGB 12.4 (L) 13.6 - 17.2 g/dL    HCT 41.8 41.1 - 50.3 %    MCV 84.8 79.6 - 97.8 FL    MCH 25.2 (L) 26.1 - 32.9 PG    MCHC 29.7 (L) 31.4 - 35.0 g/dL    RDW 16.7 (H) 11.9 - 14.6 %    PLATELET 894 142 - 889 K/uL    MPV 9.9 9.4 - 12.3 FL    ABSOLUTE NRBC 0.00 0.0 - 0.2 K/uL    DF AUTOMATED      NEUTROPHILS 91 (H) 43 - 78 %    LYMPHOCYTES 3 (L) 13 - 44 %    MONOCYTES 6 4.0 - 12.0 %    EOSINOPHILS 0 (L) 0.5 - 7.8 %    BASOPHILS 0 0.0 - 2.0 %    IMMATURE GRANULOCYTES 1 0.0 - 5.0 %    ABS. NEUTROPHILS 14.2 (H) 1.7 - 8.2 K/UL    ABS. LYMPHOCYTES 0.4 (L) 0.5 - 4.6 K/UL    ABS. MONOCYTES 0.9 0.1 - 1.3 K/UL    ABS. EOSINOPHILS 0.0 0.0 - 0.8 K/UL    ABS. BASOPHILS 0.0 0.0 - 0.2 K/UL    ABS. IMM.  GRANS. 0.1 0.0 - 0.5 K/UL   METABOLIC PANEL, COMPREHENSIVE    Collection Time: 04/12/22  3:42 PM   Result Value Ref Range    Sodium 141 136 - 145 mmol/L    Potassium 4.3 3.5 - 5.1 mmol/L    Chloride 102 98 - 107 mmol/L    CO2 32 21 - 32 mmol/L    Anion gap 7 7 - 16 mmol/L    Glucose 122 (H) 65 - 100 mg/dL    BUN 28 (H) 8 - 23 MG/DL    Creatinine 0.90 0.8 - 1.5 MG/DL    GFR est AA >60 >60 ml/min/1.73m2    GFR est non-AA >60 >60 ml/min/1.73m2    Calcium 10.5 (H) 8.3 - 10.4 MG/DL    Bilirubin, total 0.8 0.2 - 1.1 MG/DL    ALT (SGPT) 61 12 - 65 U/L    AST (SGOT) 19 15 - 37 U/L    Alk. phosphatase 91 50 - 136 U/L    Protein, total 7.7 6.3 - 8.2 g/dL    Albumin 2.3 (L) 3.2 - 4.6 g/dL    Globulin 5.4 (H) 2.3 - 3.5 g/dL    A-G Ratio 0.4 (L) 1.2 - 3.5     MAGNESIUM    Collection Time: 04/12/22  3:42 PM   Result Value Ref Range    Magnesium 2.3 1.8 - 2.4 mg/dL   TROPONIN-HIGH SENSITIVITY    Collection Time: 04/12/22  3:42 PM   Result Value Ref Range    Troponin-High Sensitivity 14.9 (H) 0 - 14 pg/mL   LACTIC ACID    Collection Time: 04/12/22  4:31 PM   Result Value Ref Range    Lactic acid 1.6 0.4 - 2.0 MMOL/L   TROPONIN-HIGH SENSITIVITY    Collection Time: 04/12/22  6:19 PM   Result Value Ref Range    Troponin-High Sensitivity 17.1 (H) 0 - 14 pg/mL       EKG Results     Procedure 720 Value Units Date/Time    EKG [[id number]] Collected: 04/12/22 1533    Order Status: Completed Updated: 04/12/22 2031     Ventricular Rate 96 BPM      Atrial Rate 96 BPM      P-R Interval 119 ms      QRS Duration 83 ms      Q-T Interval 338 ms      QTC Calculation (Bezet) 428 ms      Calculated P Axis 97 degrees      Calculated R Axis -61 degrees      Calculated T Axis 93 degrees      Diagnosis --     Sinus rhythm  Borderline short [place] interval  Abnormal R-wave progression, late transition  Inferior infarct, old      Confirmed by ST RALF SABA MD (), PRACHI MCCRARY ([id number]) on 4/12/2022 8:31:27 PM      EKG, 12 LEAD, INITIAL [[id number]]     Order Status: Canceled           All Micro Results     Procedure Component Value Units Date/Time    CULTURE, BLOOD [[id number]] Collected: 04/12/22 1819    Order Status: Completed Specimen: Blood Updated: 04/12/22 1856    CULTURE, BLOOD [[id number]] Collected: 04/12/22 1631    Order Status: Completed Specimen: Blood Updated: 04/12/22 1757          Other Studies:  CT CHEST W CONT    Result Date: 4/12/2022  History: History of lung cancer, abdominal aneurysm, reflux, shortness of breath and hypoxia. Pneumonitis.  EXAM: CT chest with IV contrast TECHNIQUE: Thin section axial CT images are obtained from the thoracic inlet through the upper abdomen after the uneventful administration of 100 cc Isovue-370. Radiation dose reduction techniques were used for this study. Our CT scanners use one or all of the following: Automated exposure control, adjustment of the mA and/or kV according to patient size, use of iterative reconstruction. No comparison FINDINGS: There is a left pleural effusion with significant volume loss on the left and abnormal airspace opacity. There is leftward mediastinal shift. There is abnormal fluid/debris filling the left main bronchus. Emphysematous change present within the right lung with hyperexpansion of the lung. No suspicious pulmonary nodules. Evaluation of the upper abdomen demonstrates evidence of remote granulomatous disease with calcified granulomas present. Bone window evaluation demonstrates no aggressive osseous lesions. 1. Left pleural effusion. 2. Airspace consolidation of the left lung with some degree of left sided atelectasis. There is debris/fluid within the left main bronchus. Given the patient's clinical history, note that recurrent malignancy cannot be excluded on this exam. Recommend correlation with prior studies for further evaluation. If no prior studies are available, either short-term interval follow-up or PET/CT would be recommended. 3. Emphysematous change present in the right lung with hyperexpansion. 4. Evidence of remote granulomatous disease. XR CHEST PORT    Result Date: 4/12/2022  Portable chest x-ray CLINICAL INDICATION: Shortness of breath FINDINGS: Single AP view the chest submitted without comparison. There is near complete whiteout of the left hemithorax with shift of the mediastinum and cardiac silhouette into the left hemithorax. There is hyperexpansion of the right lung parenchyma. There is concern for a right lateral pneumothorax. Otherwise, there is no airspace consolidation in the right lung. The bones are osteopenic.      1. Hyperexpansion of the right lung with concern for a right lateral pneumothorax. Recommend performing a left lateral decubitus chest x-ray if the patient is unable to be completely upright. 2. Complete whiteout of the left hemithorax most likely representing atelectasis and a probable component of pneumonia and pleural effusion.         Medications:  Medications Administered      Medications Administered     azithromycin (ZITHROMAX) 500 mg in 0.9% sodium chloride 250 mL ([id number])     Admin Date  04/12/2022 Action  New Bag Dose  500 mg Rate  250 mL/hr Route  IntraVENous Administered By  Jody Jara RN          cefTRIAXone (ROCEPHIN) 2 g in 0.9% sodium chloride (MBP/ADV) 50 mL MBP     Admin Date  04/12/2022 Action  New Bag Dose  2 g Rate  100 mL/hr Route  IntraVENous Administered By  Jody Jara RN          iopamidoL (ISOVUE-370) 76 % injection 100 mL     Admin Date  04/12/2022 Action  Given Dose  100 mL Route  IntraVENous Administered By  Felton Antis          saline peripheral flush soln 10 mL     Admin Date  04/12/2022 Action  Given Dose  10 mL Route  InterCATHeter Administered By  Felton Antis          sodium chloride (NS) flush 5-10 mL     Admin Date  04/12/2022 Action  Given Dose  10 mL Route  IntraVENous Administered By  Jody Jara RN          sodium chloride 0.9 % bolus infusion     Admin Date  04/12/2022 Action  New Bag Dose  250 mL/hr Rate  250 mL/hr Route  IntraVENous Administered By  Jody Jara RN          sodium chloride 0.9 % bolus infusion 100 mL     Admin Date  04/12/2022 Action  New Bag Dose  100 mL Rate   Route  IntraVENous Administered By  Felton Antis                    Signed By: Gama Glynn MD   [facility]    April 13, 2022   1:05 AM

## 2022-04-13 NOTE — PROGRESS NOTES
Physician Progress Note      Jonn Porter  CSN #:                  [id number]  :                       1935  ADMIT DATE:       2022 3:22 PM  [address] [place] DATE:  RESPONDING  PROVIDER #:        Ileana CHANDLER MD          QUERY TEXT:    Pt admitted with PNA. Pt noted has 02 chronically at 3L. If possible, please document in the progress notes and discharge summary if you are evaluating and/or treating any of the following: The medical record reflects the following:  Risk Factors: stage 3 adenocarcinoma lung  Clinical Indicators: \" chronically on 3 L \"  Treatment: 02 @ 3L N/C  Options provided:  -- Chronic respiratory failure with hypoxia  -- Does not have chronic respiratory failure with hypoxia . -- Other - I will add my own diagnosis  -- Disagree - Not applicable / Not valid  -- Disagree - Clinically unable to determine / Unknown  -- Refer to Clinical Documentation Reviewer    PROVIDER RESPONSE TEXT:    This patient has chronic respiratory failure with hypoxia.     Query created by: Patti Hagan on 2022 8:22 AM      Electronically signed by:  Ileana Antunez MD 2022 1:00 PM

## 2022-04-13 NOTE — PROGRESS NOTES
ACUTE PHYSICAL THERAPY GOALS:  (Developed with and agreed upon by patient and/or caregiver. )  LTG:  (1.)Mr. Juana Heart will move from supine to sit and sit to supine , scoot up and down and roll side to side in bed with INDEPENDENCE within 7 treatment day(s). (2.)Mr. Juana Heart will transfer from bed to chair and chair to bed with MODIFIED INDEPENDENCE using the least restrictive device within 7 treatment day(s). (3.)Mr. Juana Heart will ambulate with SUPERVISION for a minimum of 150 feet with the least restrictive device within 7 treatment day(s). ________________________________________________________________________________________________      PHYSICAL THERAPY ASSESSMENT: Initial Assessment and Daily Note PT Treatment Day # 1      Oran Peabody. Alverda Crutch is a 80 y.o. male   PRIMARY DIAGNOSIS: [facility]  Pneumonia [J18.9]       Reason for Referral:    ICD-10: Treatment Diagnosis: Generalized Muscle Weakness (M62.81)  Difficulty in walking, Not elsewhere classified (R26.2)  Other abnormalities of gait and mobility (R26.89)  [place] / Product Type: Medicare /     ASSESSMENT:     REHAB RECOMMENDATIONS:   Recommendation to date pending progress:  Setting:   Short-term Rehab  Equipment:    Rolling Walker     PRIOR LEVEL OF FUNCTION:  (Prior to Hospitalization) INITIAL/CURRENT LEVEL OF FUNCTION:  (Most Recently Demonstrated)   Bed Mobility:   Independent  Sit to Stand:   Independent  Transfers:   Independent  Gait/Mobility:   Modified Independent Bed Mobility:   Standby Assistance  Sit to Stand:   Contact Guard Assistance x 2  Transfers:   Minimal Assistance x 2  Gait/Mobility:   Minimal Assistance x 2     ASSESSMENT:  Mr. Juana Heart presents with pneumonia and increased SOB and weaknes. He has a PMH significant for stage 3 adenocarcinoma lung cancer and lives alone at baseline.  He reports that he normally ambulates with SPC or furniture walks and has had no falls. Per family he was found in bed and having difficulty with ADLs this week prior to being admitted. Today he is able to ambulate 5' to the bedside chair with HHA x2 and min A for increased stability as well as safety. He demonstrates decreased strength, functional mobility and safety with ambulation. When brushing his teeth with OT he began to choke on water when rinsing his mouth out, RN notified as well as speech for potential consult. Pt would continue to benefit from skilled PT to facilitate improvements in the above stated deficits and promote return to baseline. He would benefit from STR stay upon DC due to decreased safety and functional mobility as well as his living situation.       SUBJECTIVE:   Mr. Anil Mitchell states, \"That was my girlfriend\"    SOCIAL HISTORY/LIVING ENVIRONMENT: Pt lives alone in a single level home with 2 steps to enter, independent with all ADLs, uses [facility] for gait  Home Environment: Trailer/mobile home  # Steps to Enter: 0 (ramp)  One/Two Story Residence: One story  Living Alone: Yes  Support Systems: Child(meir)  OBJECTIVE:     PAIN: VITAL SIGNS: LINES/DRAINS:   Pre Treatment: Pain Screen  Pain Scale 1: Numeric (0 - 10)  Pain Intensity 1: 0  Post Treatment: 0   IV and Purewick  O2 Device: Nasal cannula     GROSS EVALUATION:   Within Functional Limits Abnormal/ Functional Abnormal/ Non-Functional (see comments) Not Tested Comments:   AROM [x] [] [] []    PROM [] [] [] []    Strength [] [x] [] []    Balance [] [x] [] []    Posture [] [] [] []    Sensation [x] [] [] []    Coordination [] [x] [] [] Slow and careful coordination   Tone [] [] [] []    Edema [] [] [] []    Activity Tolerance [] [x] [] []     [] [] [] []      COGNITION/  PERCEPTION: Intact Impaired   (see comments) Comments:   Orientation [x] []    Vision [] [x] Wears glasses   Hearing [] [x] Mild [place]   Command Following [] [x] Potentially due to some DAIN [facility]   Safety Awareness [] [x]     [] [] MOBILITY: I Mod I S SBA CGA Min Mod Max Total  NT x2 Comments:   Bed Mobility    Rolling [] [] [] [x] [] [] [] [] [] [] []    Supine to Sit [] [] [] [x] [] [] [] [] [] [] []    Scooting [] [] [] [x] [] [] [] [] [] [] []    Sit to Supine [] [] [] [] [] [] [] [] [] [x] []    Transfers    Sit to Stand [] [] [] [] [x] [x] [] [] [] [] [x]    Bed to Chair [] [] [] [] [] [x] [] [] [] [] [x]    Stand to Sit [] [] [] [] [] [x] [] [] [] [] [x]    I=Independent, Mod I=Modified Independent, S=Supervision, SBA=Standby Assistance, CGA=Contact Guard Assistance,   Min=Minimal Assistance, Mod=Moderate Assistance, Max=Maximal Assistance, Total=Total Assistance, NT=Not Tested  GAIT: I Mod I S SBA CGA Min Mod Max Total  NT x2 Comments:   Level of Assistance [] [] [] [] [] [x] [] [] [] [] [x]    Distance 5'    DME HHA x2    Gait Quality Decreased step length, decreased step clearance, forward flexion    Weightbearing Status N/A     I=Independent, Mod I=Modified Independent, S=Supervision, SBA=Standby Assistance, CGA=Contact Guard Assistance,   Min=Minimal Assistance, Mod=Moderate Assistance, Max=Maximal Assistance, Total=Total Assistance, NT=Not Tested    [facility] [employer] [facility] 6 Clicks   Basic Mobility Inpatient Short Form       How much difficulty does the patient currently have. .. Unable A Lot A Little None   1. Turning over in bed (including adjusting bedclothes, sheets and blankets)? [] 1   [] 2   [] 3   [x] 4   2. Sitting down on and standing up from a chair with arms ( e.g., wheelchair, bedside commode, etc.)   [] 1   [] 2   [x] 3   [] 4   3. Moving from lying on back to sitting on the side of the bed? [] 1   [] 2   [x] 3   [] 4   How much help from another person does the patient currently need. .. Total A Lot A Little None   4. Moving to and from a bed to a chair (including a wheelchair)? [] 1   [] 2   [x] 3   [] 4   5. Need to walk in hospital room? [] 1   [x] 2   [] 3   [] 4   6.   Climbing 3-5 steps with a railing? [] 1   [x] 2   [] 3   [] 4   © 2007, Trustees of [address] [place] [address] [address] [id number], under license to [employer]. All rights reserved     Score:  Initial: 17 Most Recent: X (Date: -- )    Interpretation of Tool:  Represents activities that are increasingly more difficult (i.e. Bed mobility, Transfers, Gait). PLAN:   FREQUENCY/DURATION: PT Plan of Care: 3 times/week for duration of hospital stay or until stated goals are met, whichever comes first.    PROBLEM LIST:   (Skilled intervention is medically necessary to address:)  1. Decreased ADL/Functional Activities  2. Decreased Activity Tolerance  3. Decreased Balance  4. Decreased Coordination  5. Decreased Gait Ability  6. Decreased Strength  7. Decreased Transfer Abilities   INTERVENTIONS PLANNED:   (Benefits and precautions of physical therapy have been discussed with the patient.)  1. Therapeutic Activity  2. Therapeutic Exercise/HEP  3. Neuromuscular Re-education  4. Gait Training  5. Manual Therapy  6. Education     TREATMENT:     EVALUATION: Moderate Complexity : (Untimed Charge)    TREATMENT:   ($$ Therapeutic Activity: 8-22 mins    )  Co-Treatment PT/OT necessary due to patient's decreased overall endurance/tolerance levels, as well as need for high level skilled assistance to complete functional transfers/mobility and functional tasks  Therapeutic Activity (15 Minutes): Therapeutic activity included Rolling, Supine to Sit, Sit to Supine, Transfer Training, Ambulation on level ground, Sitting balance  and Standing balance to improve functional Mobility, Strength and Activity tolerance.     TREATMENT GRID:  N/A    AFTER TREATMENT POSITION/PRECAUTIONS:  Alarm Activated, Chair, Needs within reach and RN notified    INTERDISCIPLINARY COLLABORATION:  RN/PCT, PT/PTA and OT/KING    TOTAL TREATMENT DURATION:  PT Patient Time In/Time Out  Time In: 2282  Time Out: [address] [place], [place]

## 2022-04-13 NOTE — PROGRESS NOTES
Pt in bed alert and oriented pt denies pain or need at this time. Pt on 3L NC with stable respirations, O2 sat 94%. Pt has NS infusing at 75cc/h and Zosyn. Call light in reach and safety measures in place. SCD's connected. Pt oriented to call for assistance.  Report given to Karla Mackey

## 2022-04-13 NOTE — PROGRESS NOTES
SPEECH LANGUAGE PATHOLOGY: DYSPHAGIA  Initial Assessment and Discharge    NAME/AGE/GENDER: Luke Thornton is a 80 y.o. male  DATE: 4/13/2022  PRIMARY DIAGNOSIS: Pneumonia [J18.9]      ICD-10: Treatment Diagnosis: R13.11 Dysphagia, Oral Phase    RECOMMENDATIONS   DIET:    Easy to Chew   Thin Liquids    MEDICATIONS: With liquid     ASPIRATION PRECAUTIONS  · Slow rate of intake  · Small bites/sips  · Upright at 90 degrees during meal     COMPENSATORY STRATEGIES/MODIFICATIONS  · None     EDUCATION:  · Recommendations discussed with Nursing  · Family  · Patient     RECOMMENDATIONS for CONTINUED SPEECH THERAPY: No further speech therapy indicated at this time. ASSESSMENT   Patient presents with mild oral dysphagia due to missing dentition. Functional oral prep with increased mastication time. Able to clear oral cavity appropriately. No s/sx of pharyngeal dysphagia in session. Per OT report, choking episode occurred this morning after patient had brushed his teeth and was swishing thin liquids. He states this is not a typical occurrence and no s/sx of airway compromise noted with ~ 7 ounces of thin liquids in session. Suspect this was an isolated incident. Educated strategies to improve safety, especially with oral hygiene. Recommend easy to chew diet/thin liquids. Medications with liquid wash. No additional speech therapy indicated at this time. REHABILITATION POTENTIAL FOR STATED GOALS: Excellent    PLAN    FREQUENCY/DURATION: No further speech therapy indicated at this time as oropharyngeal swallow function is within normal limits. SUBJECTIVE   Upright in bed with visitor at bedside. Reported choking incident with OT this morning when brushing teeth. Patient denies history of dysphagia.      History of Present Injury/Illness: Mr. 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 Corona Tucker He also  has a past surgical history that includes hx hernia repair; hx total colectomy; hx tonsillectomy; hx carpal tunnel release (Bilateral); and hx other surgical.     Problem List:  (Impairments causing functional limitations):  1. Mild oral dysphagia due to missing dentition    Previous Dysphagia: NONE REPORTED  Diet Prior to Evaluation: Soft/bite sized diet    Orientation:   Person  Place  Time  Situation    Pain: Pain Scale 1: Numeric (0 - 10)  Pain Intensity 1: 0    OBJECTIVE   Oral Motor:   · Labial: No impairment  · Dentition: Upper Dentures  · Oral Hygiene: Adequate  · Lingual: No impairment    Swallow evaluation:   Patient presented with thin liquid via cup and straw, soft solid, and coarse solid consistencies. Oral prep was slightly prolonged due to missing lower dentition. He was able to clear oral cavity appropriately with increased oral prep time. Liquid rinse independently utilized to clear any oral residue. Timely swallow initiation, and single swallows upon palpation. No overt signs or symptoms of airway compromise observed with liquid or solid textures.      INTERDISCIPLINARY COLLABORATION: Occupational Therapist  PRECAUTIONS/ALLERGIES: Actonel [risedronate]     Tool Used: Dysphagia Outcome and Severity Scale (GENE)    Score Comments   Normal Diet  [] 7 With no strategies or extra time needed   Functional Swallow  [] 6 May have mild oral or pharyngeal delay   Mild Dysphagia  [] 5 Which may require one diet consistency restricted    Mild-Moderate Dysphagia  [] 4 With 1-2 diet consistencies restricted   Moderate Dysphagia  [] 3 With 2 or more diet consistencies restricted   Moderate-Severe Dysphagia  [] 2 With partial PO strategies (trials with ST only)   Severe Dysphagia  [] 1 With inability to tolerate any PO safely      Score:  Initial: 6 Most Recent: x (Date 04/13/22 )   Interpretation of Tool: The Dysphagia Outcome and Severity Scale (GENE) is a simple, easy-to-use, 7-point scale developed to systematically rate the functional severity of dysphagia based on objective assessment and make recommendations for diet level, independence level, and type of nutrition. Current Medications:   No current facility-administered medications on file prior to encounter. Current Outpatient Medications on File Prior to Encounter   Medication Sig Dispense Refill    predniSONE (DELTASONE) 10 mg tablet Take 40 mg/kg by mouth daily (with breakfast).  alfuzosin SR (UROXATRAL) 10 mg SR tablet Take  by mouth daily.  montelukast (SINGULAIR) 10 mg tablet Take 10 mg by mouth daily.  levocetirizine (XYZAL) 5 mg tablet Take 1 Tab by mouth daily. 30 Tab 5    pantoprazole (PROTONIX) 40 mg tablet Take 1 Tab by mouth daily. Indications: GASTROESOPHAGEAL REFLUX 90 Tab 1    amoxicillin (AMOXIL) 500 mg capsule Take 1 Cap by mouth two (2) times a day. 20 Cap 0    albuterol (PROVENTIL HFA, VENTOLIN HFA, PROAIR HFA) 90 mcg/actuation inhaler 1-4 puffs before exercise and every 3-6 hrs as needed. 1 Inhaler 1    ipratropium (ATROVENT) 0.06 % nasal spray 2 Sprays by Both Nostrils route four (4) times daily. Indications: PERENNIAL ALLERGIC RHINITIS 5 mL 5    tamsulosin (FLOMAX) 0.4 mg capsule Take 1 Cap by mouth daily. 90 Cap 1    sildenafil citrate (VIAGRA) 100 mg tablet Take 1 Tab by mouth as needed. 10 Tab 1    aspirin delayed-release 81 mg tablet Take  by mouth daily.          SAFETY:  After treatment position/precautions:  · Upright in bed  · RN notified  · Visitor at bedside  · Call light within reach    Total Treatment Duration:   Time In: 1480  Time Out: [address], [facility], [facility] IRISH VILLAR, YUNIER-SLP  Speech Language Pathologist  Acute Rehabilitation Services  Contact: Conrad

## 2022-04-13 NOTE — PROGRESS NOTES
ACUTE OT GOALS:  (Developed with and agreed upon by patient and/or caregiver.)  1. Patient will complete lower body bathing and dressing with SBA and adaptive equipment as needed. 2. Patient will complete toileting with CGA. 3. Patient will tolerate 30 minutes of OT treatment with 1-2 rest breaks to increase activity tolerance for ADLs. 4. Patient will complete functional transfers with Sup-V and adaptive equipment as needed. Timeframe: 7 visits       OCCUPATIONAL THERAPY ASSESSMENT: Initial Assessment and Daily Note OT Treatment Day # 1    Shannon HammonddmontXiomara Will is a 80 y.o. male   PRIMARY DIAGNOSIS: Pneumonia due to gram-negative bacteria ([facility]  Pneumonia [J18.9]       Reason for Referral:    ICD-10: Treatment Diagnosis: Generalized Muscle Weakness (M62.81)  Other abnormalities of gait and mobility (R26.89)  Dizziness and Giddiness (R42)  [place] / Product Type: Medicare /   ASSESSMENT:     REHAB RECOMMENDATIONS:   Recommendation to date pending progress:  Setting:   Short-term Rehab    vs 24/7 care at home  Equipment:    To Be Determined     PRIOR LEVEL OF FUNCTION:  (Prior to Hospitalization)* Pt stated PLOF, per chart family concerned about declining ADL function prior to current illness/hospitalization INITIAL/CURRENT LEVEL OF FUNCTION:  (Based on today's evaluation)   Bathing:   Modified Independent  Dressing:   Modified Independent  Feeding/Grooming:   Modified Independent  Toileting:   Modified Independent  Functional Mobility:   Modified Independent Bathing:   Minimal Assistance  Dressing:   Moderate Assistance  Feeding/Grooming:   Standby Assistance  Toileting:   Moderate Assistance  Functional Mobility:   Contact Guard Assistance x 2 HHA     ASSESSMENT:  Mr. Oh Will is a 81 y/o male presenting with pneumonia. Pt supine on entry A/O x 3 on 3L o2 via NC. Pt reports minimal dizziness with position changes with quick resolution.  SpO2 remained > 90% throughout. Pt presenting impulsive. Pt presents with deficits in ADLs, functional t/fs, activity tolerance and overall function compared to PLOF. Pt presents motivated and pleasant with good rehab potential. Pt would benefit from continued skilled OT services for duration of hospital stay and after d/c to next level of care or until all stated goals met. SUBJECTIVE:   Mr. Ole Salinas states, \"Not today [stand at sink], I'd like to sit down [for grooming]\"    SOCIAL HISTORY/LIVING ENVIRONMENT: Pt lives along in single level home with 2 SPENSER and 2 rails. Tub/shower with 2 grab bars and standard commode with 2 grab bars present in bathroom.   Home Environment: Trailer/mobile home  # Steps to Enter: 0 (ramp)  One/Two Story Residence: One story  Living Alone: Yes  Support Systems: Child(meir),Other Family Member(s) (STEP SON)    OBJECTIVE:     PAIN: VITAL SIGNS: LINES/DRAINS:   Pre Treatment: Pain Screen  Pain Scale 1: Numeric (0 - 10)  Pain Intensity 1: 0  Post Treatment: 0 Vital Signs  Pulse (Heart Rate): 86  Heart Rate Source: Monitor  Level of Consciousness: Alert (0)  O2 Sat (%): 93 %  O2 Device: Nasal cannula  O2 Flow Rate (L/min): 3 l/min Continuous Pulse Oximetry, IV and Purewick  O2 Device: Nasal cannula     GROSS EVALUATION:  BUE Within Functional Limits Abnormal/ Functional Abnormal/ Non-Functional (see comments) Not Tested Comments:   AROM [x] [] [] []    PROM [] [] [] [x]    Strength [] [x] [] []    Balance [] [x] [] []    Posture [x] [] [] []    Sensation [x] [] [] []    Coordination [x] [] [] []    Tone [] [] [] [x]    Edema [] [] [] [x]    Activity Tolerance [] [x] [] []     [] [] [] []      COGNITION/  PERCEPTION: Intact Impaired   (see comments) Comments:   Orientation [x] []    Vision [x] [] Glasses at all time   Hearing [x] [] WFL, min [place]   Judgment/ Insight [] [x] Impulsive   Attention [x] []    Memory [x] []    Command Following [x] []    Emotional Regulation [x] []     [] [] ACTIVITIES OF DAILY LIVING: I Mod I S SBA CGA Min Mod Max Total NT Comments   BASIC ADLs:              Bathing/ Showering [] [] [] [] [] [x] [] [] [] [] BUE and B/L thigh/shin washing seated in recliner   Toileting [] [] [] [] [] [] [] [] [] [x]    Dressing [] [] [] [] [] [] [] [] [] [x]    Feeding [] [] [] [] [] [] [] [] [] [x]    Grooming [] [] [] [x] [] [] [] [] [] [] Oral hygiene and face washing, pt exhibited cough with swishing water after oral care, NSG and SLP notified. Personal Device Care [] [] [] [] [] [] [] [] [] [x]    Functional Mobility [] [] [] [] [x] [] [] [] [] [] CGA x 2 HHA functional t/fs   I=Independent, Mod I=Modified Independent, S=Supervision, SBA=Standby Assistance, CGA=Contact Guard Assistance,   Min=Minimal Assistance, Mod=Moderate Assistance, Max=Maximal Assistance, Total=Total Assistance, NT=Not Tested    MOBILITY: I Mod I S SBA CGA Min Mod Max Total  NT x2 Comments:   Supine to sit [] [] [] [x] [] [] [] [] [] [] []    Sit to supine [] [] [] [x] [] [] [] [] [] [] []    Sit to stand [] [] [] [] [x] [] [] [] [] [] [x]    Bed to chair [] [] [] [] [x] [] [] [] [] [] [x]    I=Independent, Mod I=Modified Independent, S=Supervision, SBA=Standby Assistance, CGA=Contact Guard Assistance,   Min=Minimal Assistance, Mod=Moderate Assistance, Max=Maximal Assistance, Total=Total Assistance, NT=Not [facility] 6 Clicks   Daily Activity Inpatient Short Form        How much help from another person does the patient currently need. .. Total A Lot A Little None   1. Putting on and taking off regular lower body clothing? [] 1   [x] 2   [] 3   [] 4   2. Bathing (including washing, rinsing, drying)? [] 1   [] 2   [x] 3   [] 4   3. Toileting, which includes using toilet, bedpan or urinal?   [] 1   [x] 2   [] 3   [] 4   4. Putting on and taking off regular upper body clothing? [] 1   [] 2   [] 3   [x] 4   5. Taking care of personal grooming such as brushing teeth?    [] 1   [] 2   [] 3   [x] 4   6. Eating meals? [] 1   [] 2   [] 3   [x] 4   © 2007, Trustees of [facility] [employer], under license to [employer]. All rights reserved     Score:  Initial: 19 Most Recent: X (Date: -- )   Interpretation of Tool:  Represents activities that are increasingly more difficult (i.e. Bed mobility, Transfers, Gait). PLAN:   FREQUENCY/DURATION: OT Plan of Care: 3 times/week for duration of hospital stay or until stated goals are met, whichever comes first.    PROBLEM LIST:   (Skilled intervention is medically necessary to address:)  1. Decreased ADL/Functional Activities  2. Decreased Activity Tolerance  3. Decreased Balance  4. Decreased Cognition  5. Decreased Strength  6. Decreased Transfer Abilities   INTERVENTIONS PLANNED:   (Benefits and precautions of occupational therapy have been discussed with the patient.)  1. Self Care Training  2. Therapeutic Activity  3. Therapeutic Exercise/HEP  4. Neuromuscular Re-education  5. Education     TREATMENT:     EVALUATION: Low Complexity : (Untimed Charge)    TREATMENT:   ($$ Self Care/Home Management: 8-22 mins    )  Co-Treatment PT/OT necessary due to patient's decreased overall endurance/tolerance levels, as well as need for high level skilled assistance to complete functional transfers/mobility and functional tasks  Self Care (15 Minutes): Self care including Upper Body Bathing, Lower Body Bathing, Grooming and Energy Conservation Training to increase independence and decrease level of assistance required.     TREATMENT GRID:  N/A    AFTER TREATMENT POSITION/PRECAUTIONS:  Alarm Activated, Chair, Needs within reach, RN notified, Visitors at bedside and MD present at time of therapy exit    INTERDISCIPLINARY COLLABORATION:  MD/PA/NP, RN/PCT, PT/PTA and OT/KING    TOTAL TREATMENT DURATION:  OT Patient Time In/Time Out  Time In: 0927  Time Out: [address] [facility], OT

## 2022-04-13 NOTE — ED PROVIDER NOTES
80-year-old male with a history of lung cancer, high cholesterol, abdominal aneurysm, reflux presents with worsening shortness of breath over the past 3 weeks. He was seen by pulmonologist 1 week ago and placed on oxygen due to exertional hypoxia. He was placed on prednisone and Augmentin for possible radiation pneumonitis causing worsening shortness of breath and cough. He reports persistent cough reactive with clear sputum and worsening weakness. Son brought him to the emergency department today because he was unable to get out of bed due to weakness and shortness of breath. He lives alone. He normally goes to [facility], but wanted to come here because he states we treat him better. No fever or chest pain. Last pulmonary visit 22:  [address]  [phone]    Patient Information:  NAME: Mr. Madeleine Donald  : 1935  MRN: [id number]  Chief Complaint   Patient presents with    Shortness of Breath   PT HERE FOR SOB WITH CXR. PT O2 WITH VERY LITTLE AMBULATION JUST TO GET WEIGHT DROPPED TO 84% RA. THEN 90% RA AT REST, IT TOOK THE PT A WHILE TO RECOVER. PT STATES INCREASED SOB NOTICED EARLY LAST WEEK. PROD COUGH CLEAR SPUTUM       Referring: Ubaldo Deng MD  [address] [id number]        Chronic Inhaler Medications: Albuterol    Diagnoses and all orders for this visit:    Centrilobular emphysema (CMS/HCC)  - Home Oxygen Therapy & Equipment    Pleural effusion    Malignant neoplasm of upper lobe of left lung (CMS/HCC)  - Home Oxygen Therapy & Equipment    Radiation pneumonitis (CMS/HCC)  - CT chest without contrast; Future    Other orders  - predniSONE 10mg tablet; 40mg daily x5 days, 30mg daily x5days, 20mg daily x7 days, 10mg daily x7days  - amoxicillin-clavulanate (Augmentin) 875-125mg per tablet; Take 1 tablet by mouth 2 (two) times a day for 10 days.     1. 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 Follow up scans through March 2022 with good results, follows with Dr Jesse Stoddard    2. Unfortunately has worsened, hypoxic, x-ray with significant left-sided infiltrates. Bedside ultrasound today showing a small simple effusion, not the main etiology of his current issues. He is moving air on the left and his x-ray does appear to show some aeration although has significant inflammatory changes. Hoping this is radiation induced pneumonitis which he is in the time frame for this. No active signs of an overwhelming infection. - empiric 10 day course of Augmentin  - will initiate prednisone outlined above, plan to see him back in 2 weeks from today with a CT of the chest.  - patient does have endobronchial disease on his CT from last month, but given his aeration, and the fact he has air on ultrasound and exam, hoping this is not obstructive process. 3. Small left effusion, simple, unchanged from images below last visit    4. Patient saturations 90% on room air at rest. We ambulated the patient with saturations down to 84%. Placed on 2 L of oxygen and ambulated again with adequate saturations of 93%. Patient requires 2 L of oxygen with exertion. Return in about 2 weeks (around 4/20/2022) for 1245PM.    Xray:  Deepika Morales MD - 04/06/2022   Formatting of this note might be different from the original.   ACCESSION:[id number]     CLINICAL HISTORY: Hypoxemia. COMPARISON: 9/27/2021. FINDINGS: There is near complete opacification of the left hemithorax with shift   of the heart and mediastinal structures towards the left. There is   hyperexpansion of the right lung. Degenerative changes are seen throughout the   spine. IMPRESSION:     PROGRESSIVE OPACIFICATION OF THE LEFT HEMITHORAX SUGGESTIVE OF VOLUME LOSS.    THERE IS DIFFUSE AIRSPACE DENSITY THROUGHOUT THE LEFT LUNG WITH SHIFT OF THE   HEART AND MEDIASTINAL STRUCTURES TOWARDS THE LEFT. \"            Shortness of Breath  Associated symptoms include cough. Pertinent negatives include no fever, no headaches, no chest pain, no vomiting, no abdominal pain and no rash.         Past Medical History:   Diagnosis Date    Abdominal aneurysm without mention of rupture 2015    Actinic keratosis     Benign localized hyperplasia of prostate with urinary obstruction and other lower urinary tract symptoms (LUTS)(600.21) 2015    Cervicalgia     Chronic airway obstruction, not elsewhere classified ([facility] [address] 2015    Cramp of limb 2015    Esophageal reflux     Impotence of organic origin 2015    Kyphosis (acquired) (postural) 2015    Other symptoms involving respiratory system and chest 2015    Other voice and resonance disorders     Pure hypercholesterolemia 2015    Unspecified constipation     [facility]oles [facility]    Unspecified hemorrhoids without mention of complication     Unspecified tinnitus 2015    Urinary frequency        Past Surgical History:   Procedure Laterality Date    HX CARPAL TUNNEL RELEASE Bilateral     HX HERNIA REPAIR      HIATAL2011    HX OTHER SURGICAL      AORTIC ANEURSYM STENT PLACEMENT- DR BENAVIDES    HX TONSILLECTOMY      HX TOTAL COLECTOMY      DR Madiha Arce         Family History:   Problem Relation Age of Onset    Elevated Lipids Mother     Heart Disease Father 79        MI    No Known Problems Sister     Other Sister         STOMACH PROBLEMS       Social History     Socioeconomic History    Marital status:      Spouse name: Not on file    Number of children: Not on file    Years of education: Not on file    Highest education level: Not on file   Occupational History    Not on file   Tobacco Use    Smoking status: Former Smoker     Quit date: 2014     Years since quittin.0    Smokeless tobacco: Not on file   Substance and Sexual Activity    Alcohol use: No    Drug use: Not on file    Sexual activity: Not on file   Other Topics Concern    Not on file   Social History Narrative    Not on file     Social Determinants of Health     Financial Resource Strain:     Difficulty of Paying Living Expenses: Not on file   Food Insecurity:     Worried About Running Out of Food in the Last Year: Not on file    [place] of Food in the Last Year: Not on file   Transportation Needs:     Lack of Transportation (Medical): Not on file    Lack of Transportation (Non-Medical): Not on file   Physical Activity:     Days of Exercise per Week: Not on file    Minutes of Exercise per Session: Not on file   Stress:     Feeling of Stress : Not on file   Social Connections:     Frequency of Communication with Friends and Family: Not on file    Frequency of Social Gatherings with Friends and Family: Not on file    Attends Anglican Services: Not on file    Active Member of [address] [place] [address] or Organizations: Not on file    Attends Club or Organization Meetings: Not on file    Marital Status: Not on file   Intimate Partner Violence:     Fear of Current or Ex-Partner: Not on file    Emotionally Abused: Not on file    Physically Abused: Not on file    Sexually Abused: Not on file   Housing Stability:     Unable to Pay for Housing in the Last Year: Not on file    Number of [place] in the Last Year: Not on file    Unstable Housing in the Last Year: Not on file         ALLERGIES: Actonel [risedronate]    Review of Systems   Constitutional: Positive for fatigue. Negative for fever. HENT: Negative for hearing loss. Eyes: Negative for visual disturbance. Respiratory: Positive for cough and shortness of breath. Cardiovascular: Negative for chest pain. Gastrointestinal: Negative for abdominal pain, diarrhea, nausea and vomiting. Musculoskeletal: Negative for back pain. Skin: Negative for rash. Neurological: Positive for weakness. Negative for headaches. Psychiatric/Behavioral: Negative for confusion. All other systems reviewed and are negative. Vitals:    04/12/22 1634 04/12/22 1704 04/12/22 1734 04/12/22 2004   BP: 103/66 (!) 99/56 103/72 107/74   Pulse: 89 86 87 90   Resp: 27 26 24 26   Temp:       SpO2: 97%  96%    Weight:       Height:                Physical Exam  Vitals and nursing note reviewed. Constitutional:       Appearance: Normal appearance. He is well-developed. Comments: Chronically ill-appearing   HENT:      Head: Normocephalic and atraumatic. Nose: Nose normal.      Mouth/Throat:      Mouth: Mucous membranes are moist.   Eyes:      Pupils: Pupils are equal, round, and reactive to light. Cardiovascular:      Rate and Rhythm: Regular rhythm. Heart sounds: Normal heart sounds. Pulmonary:      Effort: Pulmonary effort is normal.      Breath sounds: Examination of the left-upper field reveals decreased breath sounds. Examination of the left-middle field reveals decreased breath sounds. Examination of the right-lower field reveals decreased breath sounds. Examination of the left-lower field reveals decreased breath sounds. Decreased breath sounds present. Abdominal:      Palpations: Abdomen is soft. Tenderness: There is no abdominal tenderness. Musculoskeletal:         General: No deformity. Normal range of motion. Cervical back: Normal range of motion and neck supple. Skin:     General: Skin is warm and dry. Neurological:      General: No focal deficit present. Mental Status: He is alert. Mental status is at baseline. Psychiatric:         Mood and Affect: Mood normal.         Behavior: Behavior normal.          MDM  Number of Diagnoses or Management Options  Diagnosis management comments: Parts of this document were created using dragon voice recognition software. The chart has been reviewed but errors may still be present.   I wore appropriate PPE throughout this patient's ED visit. Jacky Courtney MD, 9:08 PM    Will get Ct    10:35 PM  No PTX on CT. worsening debris in bronchus and pleural effusion with hypoxia. Discussed with hospitalist for admission       Amount and/or Complexity of Data Reviewed  Clinical lab tests: ordered and reviewed (Results for orders placed or performed during the hospital encounter of 04/12/22  -LACTIC ACID:        Result                      Value             Ref Range           Lactic acid                 1.6               0.4 - 2.0 MM*  -CBC WITH AUTOMATED DIFF:        Result                      Value             Ref Range           WBC                         15.7 (H)          4.3 - 11.1 K*       RBC                         4.93              4.23 - 5.6 M*       HGB                         12.4 (L)          13.6 - 17.2 *       HCT                         41.8              41.1 - 50.3 %       MCV                         84.8              79.6 - 97.8 *       MCH                         25.2 (L)          26.1 - 32.9 *       MCHC                        29.7 (L)          31.4 - 35.0 *       RDW                         16.7 (H)          11.9 - 14.6 %       PLATELET                    370               150 - 450 K/*       MPV                         9.9               9.4 - 12.3 FL       ABSOLUTE NRBC               0.00              0.0 - 0.2 K/*       DF                          AUTOMATED                             NEUTROPHILS                 91 (H)            43 - 78 %           LYMPHOCYTES                 3 (L)             13 - 44 %           MONOCYTES                   6                 4.0 - 12.0 %        EOSINOPHILS                 0 (L)             0.5 - 7.8 %         BASOPHILS                   0                 0.0 - 2.0 %         IMMATURE GRANULOCYTES       1                 0.0 - 5.0 %         ABS. NEUTROPHILS            14.2 (H)          1.7 - 8.2 K/*       ABS.  LYMPHOCYTES            0.4 (L) 0.5 - 4.6 K/*       ABS. MONOCYTES              0.9               0.1 - 1.3 K/*       ABS. EOSINOPHILS            0.0               0.0 - 0.8 K/*       ABS. BASOPHILS              0.0               0.0 - 0.2 K/*       ABS. IMM. GRANS.            0.1               0.0 - 0.5 K/*  -METABOLIC PANEL, COMPREHENSIVE:        Result                      Value             Ref Range           Sodium                      141               136 - 145 mm*       Potassium                   4.3               3.5 - 5.1 mm*       Chloride                    102               98 - 107 mmo*       CO2                         32                21 - 32 mmol*       Anion gap                   7                 7 - 16 mmol/L       Glucose                     122 (H)           65 - 100 mg/*       BUN                         28 (H)            8 - 23 MG/DL        Creatinine                  0.90              0.8 - 1.5 MG*       GFR est AA                  >60               >60 ml/min/1*       GFR est non-AA              >60               >60 ml/min/1*       Calcium                     10.5 (H)          8.3 - 10.4 M*       Bilirubin, total            0.8               0.2 - 1.1 MG*       ALT (SGPT)                  61                12 - 65 U/L         AST (SGOT)                  19                15 - 37 U/L         Alk.  phosphatase            91                50 - 136 U/L        Protein, total              7.7               6.3 - 8.2 g/*       Albumin                     2.3 (L)           3.2 - 4.6 g/*       Globulin                    5.4 (H)           2.3 - 3.5 g/*       A-G Ratio                   0.4 (L)           1.2 - 3.5      -TROPONIN-HIGH SENSITIVITY:        Result                      Value             Ref Range           Troponin-High Sensitiv*     17.1 (H)          0 - 14 pg/mL   -MAGNESIUM:        Result                      Value             Ref Range           Magnesium                   2.3               1.8 - 2.4 mg*  -TROPONIN-HIGH SENSITIVITY:        Result                      Value             Ref Range           Troponin-High Sensitiv*     14.9 (H)          0 - 14 pg/mL   -EKG, 12 LEAD, INITIAL:        Result                      Value             Ref Range           Ventricular Rate            96                BPM                 Atrial Rate                 96                BPM                 P-R Interval                119               ms                  QRS Duration                83                ms                  Q-T Interval                338               ms                  QTC Calculation (Bezet)     428               ms                  Calculated P Axis           97                degrees             Calculated R Axis           -61               degrees             Calculated T Axis           93                degrees             Diagnosis                                                     Sinus rhythm   Borderline short DE interval   Abnormal R-wave progression, late transition   Inferior infarct, old         Confirmed by ST RALF SABA MD (), PRACHI MCCRARY ([id number]) on 4/12/2022 8:31:27 PM     )  Tests in the radiology section of CPT®: ordered and reviewed (CT CHEST W CONT    Result Date: 4/12/2022  History: History of lung cancer, abdominal aneurysm, reflux, shortness of breath and hypoxia. Pneumonitis. EXAM: CT chest with IV contrast TECHNIQUE: Thin section axial CT images are obtained from the thoracic inlet through the upper abdomen after the uneventful administration of 100 cc Isovue-370. Radiation dose reduction techniques were used for this study. Our CT scanners use one or all of the following: Automated exposure control, adjustment of the mA and/or kV according to patient size, use of iterative reconstruction. No comparison FINDINGS: There is a left pleural effusion with significant volume loss on the left and abnormal airspace opacity. There is leftward mediastinal shift.  There is abnormal fluid/debris filling the left main bronchus. Emphysematous change present within the right lung with hyperexpansion of the lung. No suspicious pulmonary nodules. Evaluation of the upper abdomen demonstrates evidence of remote granulomatous disease with calcified granulomas present. Bone window evaluation demonstrates no aggressive osseous lesions. 1. Left pleural effusion. 2. Airspace consolidation of the left lung with some degree of left sided atelectasis. There is debris/fluid within the left main bronchus. Given the patient's clinical history, note that recurrent malignancy cannot be excluded on this exam. Recommend correlation with prior studies for further evaluation. If no prior studies are available, either short-term interval follow-up or PET/CT would be recommended. 3. Emphysematous change present in the right lung with hyperexpansion. 4. Evidence of remote granulomatous disease. XR CHEST PORT    Result Date: 4/12/2022  Portable chest x-ray CLINICAL INDICATION: Shortness of breath FINDINGS: Single AP view the chest submitted without comparison. There is near complete whiteout of the left hemithorax with shift of the mediastinum and cardiac silhouette into the left hemithorax. There is hyperexpansion of the right lung parenchyma. There is concern for a right lateral pneumothorax. Otherwise, there is no airspace consolidation in the right lung. The bones are osteopenic. 1. Hyperexpansion of the right lung with concern for a right lateral pneumothorax. Recommend performing a left lateral decubitus chest x-ray if the patient is unable to be completely upright.  2. Complete whiteout of the left hemithorax most likely representing atelectasis and a probable component of pneumonia and pleural effusion.    )  Tests in the medicine section of CPT®: ordered and reviewed           Procedures

## 2022-04-13 NOTE — CONSULTS
Palliative Care    Patient: Marques Sweet MRN: [id number]  SSN: [id number]    YOB: 1935  Age: 80 y.o. Sex: male       Date of Request: 4/13/2022  Date of Consult:  4/13/2022  Reason for Consult:  goals of care and medical decision making  Requesting Physician: Dr. Elfego Yo     Assessment/Plan:     Principal Diagnosis:     Dyspnea  R06.00    Additional Diagnoses:   · Debility, Unspecified  R53.81  · Frailty  R54  · Counseling, Encounter for Medical Advice  Z71.9  · Encounter for Palliative Care  Z51.5    Palliative Performance Scale (PPS):       Medical Decision Making:   Reviewed and summarized labs and imaging from admission. Met with pt at bedside. He feels breathing has improved some but still notes dyspnea. Denies any current pain. We discussed declining functional status and concerns as pt lives alone. Reviewed code status and pt confirms DNR. He understands terminal illness with adenocarcinoma. I discussed hospice philosophy and levels of care however pt did not wish to pursue hospice at this time. pulm consulted regarding effusion. Will follow and assist with continued goals discussions. Will discuss findings with members of the interdisciplinary team.      Thank you for this referral.         Subjective:     History obtained from:  Patient and Chart    Chief Complaint: dyspnea  History of Present Illness:  81 yo man, stage 3 adenocarcinoma lung, here with increased SOB and weakness, symptoms worse despite outpatient Rx, found to have persistent pneumonia and pleural effusion, possibly malignant. Hospitalist discussed with patient that even with admission and treatment of infection/dehydration, he may not get much better as symptoms may represent cancer progression. pulm following regarding effusion.   Overall functional decline noted    Advance Directive: No       Code Status:  DNR            Health Care Power of : No - Patient does not have a [address] of . Past Medical History:   Diagnosis Date    Abdominal aneurysm without mention of rupture 2015    Actinic keratosis     Adenocarcinoma of lung, stage 3, left ([facility] [address]     Benign localized hyperplasia of prostate with urinary obstruction and other lower urinary tract symptoms (LUTS)(600.21) 2015    Cervicalgia     Chronic airway obstruction, not elsewhere classified 2015    Cramp of limb 2015    Esophageal reflux     Impotence of organic origin 2015    Kyphosis (acquired) (postural) 2015    Other symptoms involving respiratory system and chest 2015    Other voice and resonance disorders     Pure hypercholesterolemia 2015    Unspecified constipation     Unspecified disorders of arteries and arterioles 2015    Unspecified hemorrhoids without mention of complication 5498    Unspecified tinnitus 2015    Urinary frequency       Past Surgical History:   Procedure Laterality Date    HX CARPAL TUNNEL RELEASE Bilateral     HX HERNIA REPAIR      HIATAL2011    HX OTHER SURGICAL      AORTIC ANEURSYM STENT PLACEMENT- DR BENAVIDES    HX TONSILLECTOMY      HX TOTAL COLECTOMY      DR Domingo Leighk     Family History   Problem Relation Age of Onset    Elevated Lipids Mother     No Known Problems Sister     Other Sister         STOMACH PROBLEMS    Heart Disease Father 79        MI      Social History     Tobacco Use    Smoking status: Former Smoker     Quit date: 2014     Years since quittin.0    Smokeless tobacco: Not on file   Substance Use Topics    Alcohol use: No     Prior to Admission medications    Medication Sig Start Date End Date Taking? Authorizing Provider   predniSONE (DELTASONE) 10 mg tablet Take 40 mg/kg by mouth daily (with breakfast). Yes Provider, Historical   alfuzosin SR (UROXATRAL) 10 mg SR tablet Take  by mouth daily. Yes Provider, Historical   montelukast (SINGULAIR) 10 mg tablet Take 10 mg by mouth daily.    Yes Provider, Historical   levocetirizine (XYZAL) 5 mg tablet Take 1 Tab by mouth daily. 12/18/15  Yes Jonathan Alaniz MD   pantoprazole (PROTONIX) 40 mg tablet Take 1 Tab by mouth daily. Indications: GASTROESOPHAGEAL REFLUX 12/4/15  Yes Jonathan Alaniz MD   amoxicillin (AMOXIL) 500 mg capsule Take 1 Cap by mouth two (2) times a day. 2/2/16   Milena Farrar NP   albuterol (PROVENTIL HFA, VENTOLIN HFA, PROAIR HFA) 90 mcg/actuation inhaler 1-4 puffs before exercise and every 3-6 hrs as needed. 12/18/15   Jonathan Alaniz MD   ipratropium (ATROVENT) 0.06 % nasal spray 2 Sprays by Both Nostrils route four (4) times daily. Indications: PERENNIAL ALLERGIC RHINITIS 12/4/15   Jonathan Alaniz MD   tamsulosin [facility]) 0.4 mg capsule Take 1 Cap by mouth daily. 12/4/15   Jonathan Alaniz MD   sildenafil citrate (VIAGRA) 100 mg tablet Take 1 Tab by mouth as needed. 6/3/15   Jesus Eubanks MD   aspirin delayed-release 81 mg tablet Take  by mouth daily. Provider, Historical       Allergies   Allergen Reactions    Actonel [Risedronate] Nausea Only        Comprehensive review of systems completed and negative with exception of noted above     Objective:     Visit Vitals  /74 (BP 1 Location: Left upper arm, BP Patient Position: At rest)   Pulse 85   Temp 98 °F (36.7 °C)   Resp 20   Ht 5' 8\" (1.727 m)   Wt 127 lb 8 oz (57.8 kg)   SpO2 92%   BMI 19.39 kg/m²        Physical Exam:    General:  Cooperative. No acute distress. Eyes:  Conjunctivae/corneas clear    Nose: Nares normal. Septum midline. Neck: Supple, symmetrical, trachea midline, no JVD   Lungs:   Coarse bilateral bs   Heart:  Regular rate and rhythm, no murmur    Abdomen:   Soft, non-tender, non-distended. Positive bowel sounds   Extremities: Normal, atraumatic, no cyanosis or edema   Skin: Skin color, texture, turgor normal. No rash or lesions.    Neurologic: Nonfocal   Psych: Alert and oriented      Assessment:     Hospital Problems  Date Reviewed: 4/13/2022 Codes Class Noted POA    Adenocarcinoma of left lung, stage 3 (HCC) ICD-10-CM: C34.92  ICD-9-CM: 162.9  4/13/2022 Yes        * (Principal) Pneumonia due to gram-negative bacteria ([facility] ICD-10-CM: J15.6  ICD-9-CM: 482.83  4/13/2022 Unknown        Pleural effusion ICD-10-CM: J90  ICD-9-CM: 511.9  4/13/2022 Unknown        Dehydration ICD-10-CM: E86.0  ICD-9-CM: 276.51  4/13/2022 Yes        Weakness generalized ICD-10-CM: R53.1  ICD-9-CM: 780.79  4/13/2022 Yes        COPD (chronic obstructive pulmonary disease) ([facility] ICD-10-CM: J44.9  ICD-9-CM: 067  1/21/2015 Yes              Signed By: Dami Hays MD     April 13, 2022

## 2022-04-14 LAB
APPEARANCE FLD: NORMAL
COLOR FLD: NORMAL
ERYTHROCYTE [DISTWIDTH] IN BLOOD BY AUTOMATED COUNT: 16.8 % (ref 11.9–14.6)
GLUCOSE FLD-MCNC: 80 MG/DL
HCT VFR BLD AUTO: 32.6 % (ref 41.1–50.3)
HGB BLD-MCNC: 9.5 G/DL (ref 13.6–17.2)
LDH FLD L TO P-CCNC: 114 U/L
LYMPHOCYTES NFR BRONCH MANUAL: 80 %
MACROPHAGES NFR BRONCH MANUAL: 7 %
MCH RBC QN AUTO: 24.9 PG (ref 26.1–32.9)
MCHC RBC AUTO-ENTMCNC: 29.1 G/DL (ref 31.4–35)
MCV RBC AUTO: 85.3 FL (ref 79.6–97.8)
MM INDURATION POC: NORMAL (ref 0–5)
NEUTROPHILS NFR BRONCH MANUAL: 13 %
NRBC # BLD: 0 K/UL (ref 0–0.2)
NUC CELL # FLD: 962 /CU MM
OTHER CELLS NFR BLD MANUAL: 7 %
PLATELET # BLD AUTO: 245 K/UL (ref 150–450)
PMV BLD AUTO: 9.9 FL (ref 9.4–12.3)
PPD POC: NEGATIVE NEGATIVE
PROT FLD-MCNC: 2.6 G/DL
RBC # BLD AUTO: 3.82 M/UL (ref 4.23–5.6)
RBC # FLD: 8000 /CU MM
SPECIMEN SOURCE FLD: NORMAL
WBC # BLD AUTO: 10.5 K/UL (ref 4.3–11.1)

## 2022-04-14 PROCEDURE — 89050 BODY FLUID CELL COUNT: CPT

## 2022-04-14 PROCEDURE — 74011250637 HC RX REV CODE- 250/637: Performed by: HOSPITALIST

## 2022-04-14 PROCEDURE — 74011000250 HC RX REV CODE- 250: Performed by: INTERNAL MEDICINE

## 2022-04-14 PROCEDURE — 74011000250 HC RX REV CODE- 250: Performed by: HOSPITALIST

## 2022-04-14 PROCEDURE — 65270000029 HC RM PRIVATE

## 2022-04-14 PROCEDURE — 84157 ASSAY OF PROTEIN OTHER: CPT

## 2022-04-14 PROCEDURE — 77010033678 HC OXYGEN DAILY

## 2022-04-14 PROCEDURE — 76604 US EXAM CHEST: CPT | Performed by: INTERNAL MEDICINE

## 2022-04-14 PROCEDURE — 94640 AIRWAY INHALATION TREATMENT: CPT

## 2022-04-14 PROCEDURE — 77030014147 HC TY THORCENT PARA TELE -B: Performed by: INTERNAL MEDICINE

## 2022-04-14 PROCEDURE — 0W993ZZ DRAINAGE OF RIGHT PLEURAL CAVITY, PERCUTANEOUS APPROACH: ICD-10-PCS | Performed by: INTERNAL MEDICINE

## 2022-04-14 PROCEDURE — 32555 ASPIRATE PLEURA W/ IMAGING: CPT | Performed by: INTERNAL MEDICINE

## 2022-04-14 PROCEDURE — 74011250636 HC RX REV CODE- 250/636: Performed by: HOSPITALIST

## 2022-04-14 PROCEDURE — 85027 COMPLETE CBC AUTOMATED: CPT

## 2022-04-14 PROCEDURE — 99232 SBSQ HOSP IP/OBS MODERATE 35: CPT | Performed by: INTERNAL MEDICINE

## 2022-04-14 PROCEDURE — 83615 LACTATE (LD) (LDH) ENZYME: CPT

## 2022-04-14 PROCEDURE — 87116 MYCOBACTERIA CULTURE: CPT

## 2022-04-14 PROCEDURE — 94762 N-INVAS EAR/PLS OXIMTRY CONT: CPT

## 2022-04-14 PROCEDURE — 74011000250 HC RX REV CODE- 250: Performed by: EMERGENCY MEDICINE

## 2022-04-14 PROCEDURE — 88112 CYTOPATH CELL ENHANCE TECH: CPT

## 2022-04-14 PROCEDURE — 88305 TISSUE EXAM BY PATHOLOGIST: CPT

## 2022-04-14 PROCEDURE — 87205 SMEAR GRAM STAIN: CPT

## 2022-04-14 PROCEDURE — 36415 COLL VENOUS BLD VENIPUNCTURE: CPT

## 2022-04-14 PROCEDURE — 97530 THERAPEUTIC ACTIVITIES: CPT

## 2022-04-14 PROCEDURE — 82945 GLUCOSE OTHER FLUID: CPT

## 2022-04-14 PROCEDURE — 97535 SELF CARE MNGMENT TRAINING: CPT

## 2022-04-14 PROCEDURE — 76040000019: Performed by: INTERNAL MEDICINE

## 2022-04-14 PROCEDURE — 2709999900 HC NON-CHARGEABLE SUPPLY: Performed by: INTERNAL MEDICINE

## 2022-04-14 PROCEDURE — 74011000258 HC RX REV CODE- 258: Performed by: HOSPITALIST

## 2022-04-14 PROCEDURE — 87102 FUNGUS ISOLATION CULTURE: CPT

## 2022-04-14 RX ORDER — PREDNISONE 20 MG/1
20 TABLET ORAL
Status: DISCONTINUED | OUTPATIENT
Start: 2022-04-15 | End: 2022-04-16 | Stop reason: HOSPADM

## 2022-04-14 RX ORDER — MIDODRINE HYDROCHLORIDE 5 MG/1
10 TABLET ORAL
Status: DISCONTINUED | OUTPATIENT
Start: 2022-04-14 | End: 2022-04-16 | Stop reason: HOSPADM

## 2022-04-14 RX ADMIN — MIDODRINE HYDROCHLORIDE 10 MG: 5 TABLET ORAL at 16:10

## 2022-04-14 RX ADMIN — LEVALBUTEROL HYDROCHLORIDE 0.63 MG: 0.63 SOLUTION RESPIRATORY (INHALATION) at 07:57

## 2022-04-14 RX ADMIN — SODIUM CHLORIDE SOLN NEBU 3% 4 ML: 3 NEBU SOLN at 07:57

## 2022-04-14 RX ADMIN — SODIUM CHLORIDE 75 ML/HR: 900 INJECTION, SOLUTION INTRAVENOUS at 09:05

## 2022-04-14 RX ADMIN — TAMSULOSIN HYDROCHLORIDE 0.4 MG: 0.4 CAPSULE ORAL at 09:02

## 2022-04-14 RX ADMIN — PIPERACILLIN AND TAZOBACTAM 3.38 G: 3; .375 INJECTION, POWDER, LYOPHILIZED, FOR SOLUTION INTRAVENOUS at 11:15

## 2022-04-14 RX ADMIN — PIPERACILLIN AND TAZOBACTAM 3.38 G: 3; .375 INJECTION, POWDER, LYOPHILIZED, FOR SOLUTION INTRAVENOUS at 04:54

## 2022-04-14 RX ADMIN — SODIUM CHLORIDE 75 ML/HR: 900 INJECTION, SOLUTION INTRAVENOUS at 21:56

## 2022-04-14 RX ADMIN — LEVALBUTEROL HYDROCHLORIDE 0.63 MG: 0.63 SOLUTION RESPIRATORY (INHALATION) at 20:05

## 2022-04-14 RX ADMIN — LEVALBUTEROL HYDROCHLORIDE 0.63 MG: 0.63 SOLUTION RESPIRATORY (INHALATION) at 15:11

## 2022-04-14 RX ADMIN — LEVALBUTEROL HYDROCHLORIDE 0.63 MG: 0.63 SOLUTION RESPIRATORY (INHALATION) at 11:29

## 2022-04-14 RX ADMIN — SODIUM CHLORIDE, PRESERVATIVE FREE 10 ML: 5 INJECTION INTRAVENOUS at 05:04

## 2022-04-14 RX ADMIN — SODIUM CHLORIDE, PRESERVATIVE FREE 10 ML: 5 INJECTION INTRAVENOUS at 12:15

## 2022-04-14 RX ADMIN — PANTOPRAZOLE SODIUM 40 MG: 40 TABLET, DELAYED RELEASE ORAL at 09:02

## 2022-04-14 RX ADMIN — PIPERACILLIN AND TAZOBACTAM 3.38 G: 3; .375 INJECTION, POWDER, LYOPHILIZED, FOR SOLUTION INTRAVENOUS at 21:45

## 2022-04-14 RX ADMIN — SODIUM CHLORIDE SOLN NEBU 3% 4 ML: 3 NEBU SOLN at 20:06

## 2022-04-14 RX ADMIN — MIDODRINE HYDROCHLORIDE 10 MG: 5 TABLET ORAL at 12:17

## 2022-04-14 RX ADMIN — ASPIRIN 81 MG: 81 TABLET ORAL at 09:02

## 2022-04-14 RX ADMIN — SODIUM CHLORIDE, PRESERVATIVE FREE 10 ML: 5 INJECTION INTRAVENOUS at 21:46

## 2022-04-14 RX ADMIN — ENOXAPARIN SODIUM 40 MG: 40 INJECTION SUBCUTANEOUS at 09:02

## 2022-04-14 NOTE — PROGRESS NOTES
Pt resting in bed comfortably at this time, alert and oriented to person. No distress noted, respirations even and unlabored on 2 L NC. Pt denies pain at this time. Pt instructed to call for assistance if needed, call light in place, will continue to monitor. Bed alarm ON.

## 2022-04-14 NOTE — PROGRESS NOTES
ACUTE OT GOALS:  (Developed with and agreed upon by patient and/or caregiver.)  1. Patient will complete lower body bathing and dressing with SBA and adaptive equipment as needed. 2. Patient will complete toileting with CGA. 3. Patient will tolerate 30 minutes of OT treatment with 1-2 rest breaks to increase activity tolerance for ADLs. 4. Patient will complete functional transfers with Sup-V and adaptive equipment as needed.      Timeframe: 7 visits     OCCUPATIONAL THERAPY: Daily Note OT Treatment Day # 2    See [place]. Renuka Zavala is a 80 y.o. male   PRIMARY DIAGNOSIS: Pneumonia due to gram-negative bacteria ([facility] [address]  Pneumonia [J18.9]  Procedure(s) (LRB):  THORACENTESIS (Left)  ULTRASOUND (Bilateral)  Day of Surgery  [place] / [place] / Product Type: Medicare /   ASSESSMENT:     REHAB RECOMMENDATIONS: CURRENT LEVEL OF FUNCTION:  (Most Recently Demonstrated)   Recommendation to date pending progress:  Setting:   Short-term Rehab    vs 24/7 care at home  Equipment:    To Be Determined Bathing:   Not tested  Dressing:   Contact Guard Assistance  Feeding/Grooming:   Standby Assistance  Toileting:   Not tested  Functional Mobility:   Minimal Assistance     ASSESSMENT:  Mr. Renuka Zavala presents supine in bed on entry A/O x 3 on 3L o2 via NC. Mitt present on R hand, pt reports hand warmer to assist with continuous pulse oximeter. Replaced at end of session. NSG reports prior hypotensive episodes and to monitor BP. VS monitored, pt denied SOB, light headedness or dizziness throughout. BP dropped Standing slightly and returned to WNL once supine end of session. Pt reports rapid fatigue with ADLs and ther-act. Increased rest break frequency/duration required throughout. Pt making steady progress since [facility]. Will continue with POC as appropriate.      SUBJECTIVE:   Mr. Renuka Zavala states, \"This is what makes me tired, doing something moving\" During LB dressing    SOCIAL HISTORY/LIVING ENVIRONMENT:   Home Environment: Trailer/mobile home  # Steps to Enter: 0 (ramp)  One/Two Story Residence: One story  Living Alone: Yes  Support Systems: Child(meir)    OBJECTIVE:     PAIN: VITAL SIGNS: LINES/DRAINS:   Pre Treatment: Pain Screen  Pain Scale 1: Numeric (0 - 10)  Pain Intensity 1: 0  Post Treatment: 0 Vital Signs  Pulse (Heart Rate): 78  Heart Rate Source: Monitor  Level of Consciousness: Alert (0)  BP: (!) 108/52  MAP (Calculated): 71  BP 1 Location: Left upper arm  BP 1 Method: Automatic  BP Patient Position: Supine  More BP/Pulse rows needed?: Yes  O2 Sat (%): 99 %  O2 Device: Nasal cannula  O2 Flow Rate (L/min): 3 l/min  Additional Blood Pressure/Pulse Data  Pulse 2: 78  BP 2: 105/67  MAP 2 (Calculated): 80  BP 2 Location: Left arm  BP Method 2: Automatic  Patient Position 2: Sitting  Pulse 3: 91  BP 3: 95/46  MAP 3 (Calculated): (!) 62  BP 3 Location: Left arm  BP Method 3: Automatic  Patient Position 3: Standing  Pulse 4: 75  BP 4: 115/48  MAP 4 (Calculated): 70  BP 4 Location: Left arm  BP Method 4: Automatic  Patient Position 4: Supine Continuous Pulse Oximetry, IV and Purewick  O2 Device: Nasal cannula     ACTIVITIES OF DAILY LIVING: I Mod I S SBA CGA Min Mod Max Total NT Comments   BASIC ADLs:              Bathing/ Showering [] [] [] [] [] [] [] [] [] [x]    Toileting [] [] [] [] [] [] [] [] [] [x]    Dressing [] [] [] [] [x] [] [] [] [] [] Doff/don socks seated EOB   Feeding [] [] [] [] [] [] [] [] [] [x]    Grooming [] [] [] [x] [] [] [] [] [] [] Shaving with electric shaver, face washing seated EOB   Personal Device Care [] [] [] [] [] [] [] [] [] [x]    Functional Mobility [] [] [] [] [x] [x] [] [] [] [] CGA bed mobility, Min A sit <-> stand    I=Independent, Mod I=Modified Independent, S=Supervision, SBA=Standby Assistance, CGA=Contact Guard Assistance,   Min=Minimal Assistance, Mod=Moderate Assistance, Max=Maximal Assistance, Total=Total Assistance, NT=Not Tested    MOBILITY: I Mod I S SBA CGA Min Mod Max Total  NT x2 Comments:   Supine to sit [] [] [] [] [x] [] [] [] [] [] []    Sit to supine [] [] [] [] [x] [] [] [] [] [] []    Sit to stand [] [] [] [] [] [x] [] [] [] [] []    Bed to chair [] [] [] [] [] [] [] [] [] [x] []    I=Independent, Mod I=Modified Independent, S=Supervision, SBA=Standby Assistance, CGA=Contact Guard Assistance,   Min=Minimal Assistance, Mod=Moderate Assistance, Max=Maximal Assistance, Total=Total Assistance, NT=Not Tested    BALANCE: Good Fair+ Fair Fair- Poor NT Comments   Sitting Static [] [x] [] [] [] []    Sitting Dynamic [] [x] [] [] [] []              Standing Static [] [x] [] [] [] []    Standing Dynamic [] [x] [] [] [] []      PLAN:   FREQUENCY/DURATION: OT Plan of Care: 3 times/week for duration of hospital stay or until stated goals are met, whichever comes first.    TREATMENT:   TREATMENT:   ($$ Self Care/Home Management: 8-22 mins$$ Therapeutic Activity: 8-22 mins   )  Therapeutic Activity (10 Minutes): Therapeutic activity included Supine to Sit, Sit to Supine, Lateral Scooting, Sitting balance  and Standing balance to improve functional Mobility, Strength and Activity tolerance. Self Care (15 Minutes): Self care including Lower Body Dressing, Grooming and Energy Conservation Training to increase independence and decrease level of assistance required.     TREATMENT GRID:  N/A    AFTER TREATMENT POSITION/PRECAUTIONS:  Alarm Activated, Bed, Needs within reach and RN notified    INTERDISCIPLINARY COLLABORATION:  RN/PCT and OT/KING    TOTAL TREATMENT DURATION:  OT Patient Time In/Time Out  Time In: 1535  Time Out: [address], OT

## 2022-04-14 NOTE — PROGRESS NOTES
Pt resting in bed comfortably at this time, alert and oriented times 4 with periodic confusion. No distress noted, respirations even and unlabored on 3 L NC. Pt denies pain at this time. Pt instructed to call for assistance if needed, call light in place, will continue to monitor. Will prepare for bedside shift report.

## 2022-04-14 NOTE — PROGRESS NOTES
Problem: Pressure Injury - Risk of  Goal: *Prevention of pressure injury  Description: Document Blayne Scale and appropriate interventions in the flowsheet.   Outcome: Progressing Towards Goal  Note: Pressure Injury Interventions:  Sensory Interventions: Assess changes in LOC    Moisture Interventions: Absorbent underpads    Activity Interventions: Assess need for specialty bed    Mobility Interventions: Assess need for specialty bed    Nutrition Interventions: Document food/fluid/supplement intake    Friction and Shear Interventions: Apply protective barrier, creams and emollients

## 2022-04-14 NOTE — ACP (ADVANCE CARE PLANNING)
Advance Care Planning   Advance Care Planning Inpatient Note  Dayna Fisher [facility] Care Department    Today's Date: 4/14/2022  Unit: [facility] 8 MED SURG    Received request from Health Care provider. Upon review of chart and communication with care team, patient's decision making abilities are not in question. Patient and Friends was/were present in the room during visit. Goals of ACP Conversation:  Discuss Advance Care planning documents  Facilitate a discussion related to patient's goals of care as they align with the patient's values and beliefs    Health Care Decision Makers:    No healthcare decision makers have been documented. Click here to complete [address] including selection of the Healthcare Decision Maker Relationship (ie \"Primary\")    Summary:  No Decision Maker named by patient at this time    Advance Care Planning Documents (Patient Wishes) on file:  None     Assessment:     Patient states that he has HCPOA documents at home, and does not want to file new documents.  advised patient to have a copy brought to hospital to place in chart.     Interventions:  Requested patient/family submit existing document(s) for our records: [address] of /Advance Directive appointment of Health care agent    Care Preferences Communicated:  No    Outcomes/Plan:  Teach Back Method used to verify the patient/Healthcare Decision Maker's understanding of key information in the advance directive documents    Chaplain Franklyn on 4/14/2022 at 1:24 PM

## 2022-04-14 NOTE — PROGRESS NOTES
[facility] Hospitalist Progress Note     Name:  Idalia Sacks. Gambrell  Age:86 y.o. Sex:male   :  1935    MRN:  [id number]     Admit Date:  2022    Reason for Admission:  Pneumonia [J18.9]    Hospital Course/Interval history:     Patient is an 80-year-old male with history of stage III adenocarcinoma status post radiation, COPD, HLD, BPH, AAA admitted on  with acute on chronic hypoxic respiratory failure in view of left sided pleural effusion and gram-negative bacterial pneumonia with left lung consolidation. Patient was empirically started on Zosyn for gram-negative and anaerobic coverage. Bronchodilators have been initiated      Assessment & Plan     Acute on chronic respiratory failure// Pneumonia due to gram-negative bacteria // Left pleural effusion:  :  Patient continues to be on 3 to 4 L BNC, close to his baseline. Titrate for SPO2 greater than 92%. Zosyn 3.375 g IV every 8 hours, EOT   Appreciate pulmonary recommendation, plan for thoracentesis on left side today. Xopenex nebs 4 times daily with saline nebs. Prednisone 20 mg p.o. daily from 4/15. Generalized weakness:  Continue PT and OT  Possible plan for short-term rehab. Dehydration:  Resolved    Hx of stage 3 adenocarcinoma of left lung: In remission following radiation therapy. We will follow-up with cytology from pleural fluid collected on . BPH with LUTS:  Continue Flomax, no concerns for urinary retention. Diet:  DIET ADULT  DVT PPx: Lovenox  Code status: DNR  Disposition/Expected LOS: Possible discharge to short-term rehab in the next 2 to 3 days if medically stable. Subjective (22): Patient seen at bedside, resting in bed comfortably. He is currently on 3 to 4 L via NC. Patient's girlfriend's daughter is present at bedside, discussed about ongoing plan of care and potential discharge to rehab in next 2 to 3 days.   No fever, chills, worsening cough, chest pain, nausea vomiting or abdominal pain. Review of Systems: 14 point review of systems is otherwise negative with the exception of the elements mentioned above. Objective:     Patient Vitals for the past 24 hrs:   Temp Pulse Resp BP SpO2   04/14/22 0405  60  (!) 116/57    04/14/22 0319 98 °F (36.7 °C) 68 17 (!) 101/56 96 %   04/13/22 2214 98.1 °F (36.7 °C) 73 16 110/67 98 %   04/13/22 2043     97 %   04/13/22 1931 97.9 °F (36.6 °C) 84 18 95/68 98 %   04/13/22 1528     95 %   04/13/22 1455 98.2 °F (36.8 °C) 76 20 105/63 93 %   04/13/22 1332     95 %   04/13/22 1118 98 °F (36.7 °C) 85 20 115/74 92 %   04/13/22 0927  86   93 %   04/13/22 0737 97.4 °F (36.3 °C) 77 20 107/64 94 %   04/13/22 0729     95 %     Oxygen Therapy  O2 Sat (%): 96 % (04/14/22 0319)  Pulse via Oximetry: 79 beats per minute (04/13/22 2043)  O2 Device: Nasal cannula (04/13/22 2043)  Skin Assessment: Clean, dry, & intact (04/13/22 2043)  Skin Protection for O2 Device: No (04/13/22 0805)  O2 Flow Rate (L/min): 3 l/min (04/13/22 2043)  FIO2 (%): 32 % (04/13/22 2043)    Body mass index is 19.39 kg/m². Physical Exam:   General:  Alert, awake, elderly 4 L via NC.   Lungs:  Clear to auscultation bilaterally   CV:  Regular rate and rhythm with normal S1 and S2   Abdomen:  Soft, nontender, nondistended, normoactive bowel sounds   Extremities:  No cyanosis clubbing or edema   Neuro:              GCS 15, cranial nerves intact, no motor or sensory deficit  Psych:  AOx3, mood and affect flat    Data Review:  I have reviewed all labs, meds, and studies from the last 24 hours:    Labs:    Recent Results (from the past 24 hour(s))   CBC W/O DIFF    Collection Time: 04/14/22  3:56 AM   Result Value Ref Range    WBC 10.5 4.3 - 11.1 K/uL    RBC 3.82 (L) 4.23 - 5.6 M/uL    HGB 9.5 (L) 13.6 - 17.2 g/dL    HCT 32.6 (L) 41.1 - 50.3 %    MCV 85.3 79.6 - 97.8 FL    MCH 24.9 (L) 26.1 - 32.9 PG    MCHC 29.1 (L) 31.4 - 35.0 g/dL    RDW 16.8 (H) 11.9 - 14.6 %    PLATELET 245 150 - 450 K/uL    MPV 9.9 9.4 - 12.3 FL    ABSOLUTE NRBC 0.00 0.0 - 0.2 K/uL       All Micro Results     Procedure Component Value Units Date/Time    CULTURE, RESPIRATORY/SPUTUM/BRONCH Svetlana Garcia [[id number]]     Order Status: Sent Specimen: Sputum     CULTURE, BLOOD [[id number]] Collected: 04/12/22 1631    Order Status: Completed Specimen: Blood Updated: 04/13/22 0847     Special Requests: --        LEFT  Antecubital       Culture result: NO GROWTH AFTER 14 HOURS       CULTURE, BLOOD [[id number]] Collected: 04/12/22 1819    Order Status: Completed Specimen: Blood Updated: 04/13/22 0847     Special Requests: --        LEFT  ARM       Culture result: NO GROWTH AFTER 13 HOURS             EKG Results     Procedure 720 Value Units Date/Time    EKG [[id number]] Collected: 04/12/22 1533    Order Status: Completed Updated: 04/12/22 2031     Ventricular Rate 96 BPM      Atrial Rate 96 BPM      P-R Interval 119 ms      QRS Duration 83 ms      Q-T Interval 338 ms      QTC Calculation (Bezet) 428 ms      Calculated P Axis 97 degrees      Calculated R Axis -61 degrees      Calculated T Axis 93 degrees      Diagnosis --     Sinus rhythm  Borderline short GA interval  Abnormal R-wave progression, late transition  Inferior infarct, old      Confirmed by ST RALF SABA MD (), PRACHI MCCRARY ([id number]) on 4/12/2022 8:31:27 PM      EKG, 12 LEAD, INITIAL [[id number]]     Order Status: Canceled           Other Studies:  No results found.     Current Meds:   Current Facility-Administered Medications   Medication Dose Route Frequency    tamsulosin (FLOMAX) capsule 0.4 mg  0.4 mg Oral DAILY    pantoprazole (PROTONIX) tablet 40 mg  40 mg Oral DAILY    aspirin delayed-release tablet 81 mg  81 mg Oral DAILY    acetaminophen (TYLENOL) tablet 650 mg  650 mg Oral Q6H PRN    Or    acetaminophen (TYLENOL) suppository 650 mg  650 mg Rectal Q6H PRN    polyethylene glycol (MIRALAX) packet 17 g  17 g Oral DAILY PRN    ondansetron (ZOFRAN ODT) tablet 4 mg 4 mg Oral Q8H PRN    Or    ondansetron (ZOFRAN) injection 4 mg  4 mg IntraVENous Q6H PRN    enoxaparin (LOVENOX) injection 40 mg  40 mg SubCUTAneous DAILY    0.9% sodium chloride infusion  75 mL/hr IntraVENous CONTINUOUS    tuberculin injection 5 Units  5 Units IntraDERMal ONCE    piperacillin-tazobactam (ZOSYN) 3.375 g in 0.9% sodium chloride (MBP/ADV) 100 mL MBP  3.375 g IntraVENous Q8H    albuterol (PROVENTIL VENTOLIN) nebulizer solution 2.5 mg  2.5 mg Nebulization Q4H PRN    levalbuterol (XOPENEX) nebulizer soln 0.63 mg/3 mL  0.63 mg Nebulization QID RT    sodium chloride 3% hypertonic nebulizer soln  4 mL Nebulization BID RT    sodium chloride (NS) flush 5-10 mL  5-10 mL IntraVENous Q8H    sodium chloride (NS) flush 5-10 mL  5-10 mL IntraVENous PRN       Problem List:  Hospital Problems as of 4/14/2022 Date Reviewed: 4/13/2022          Codes Class Noted - Resolved POA    Adenocarcinoma of left lung, stage 3 (HCC) ICD-10-CM: C34.92  ICD-9-CM: 162.9  4/13/2022 - Present Yes        * (Principal) Pneumonia due to gram-negative bacteria (HCC) ICD-10-CM: J15.6  ICD-9-CM: 482.83  4/13/2022 - Present Unknown        Pleural effusion ICD-10-CM: J90  ICD-9-CM: 511.9  4/13/2022 - Present Unknown        Dehydration ICD-10-CM: E86.0  ICD-9-CM: 276.51  4/13/2022 - Present Yes        Weakness generalized ICD-10-CM: R53.1  ICD-9-CM: 780.79  4/13/2022 - Present Yes        COPD (chronic obstructive pulmonary disease) (HCC) ICD-10-CM: J44.9  ICD-9-CM: 496  1/21/2015 - Present Yes                   Part of this note was written by using a voice dictation software and the note has been proof read but may still contain some grammatical/other typographical errors.     Signed By: Evelyn Polanco MD   [facility]    April 14, 2022  5:15 PM

## 2022-04-14 NOTE — H&P
Date of Surgery Update:  Nolberto Foley. Naif Greenberg was seen and examined. History and physical has been reviewed. The patient has been examined.  There have been no significant clinical changes since the completion of the originally dated History and Physical.    Signed By: Shey Frederick MD     April 14, 2022 10:17 AM

## 2022-04-14 NOTE — PROGRESS NOTES
Pt resting quietly in bed. Respirations are even and unlabored on 3L NC. No signs of distress noted or needs stated at this time. Pt unable to provide sputum sample at this time. Call light in reach. Will continue to monitor. SBAR to be given to oncoming nurse.

## 2022-04-14 NOTE — PROGRESS NOTES
Comprehensive Nutrition Assessment    Type and Reason for Visit: Initial,Positive nutrition screen  Best Practice Alert for Malnutrition Screening Tool: Recently Lost Weight Without Trying: Yes, If Yes, How Much Weight Loss: 14 - 23 lbs, Eating Poorly Due to Decreased Appetite: Yes    Nutrition Recommendations/Plan:   Meals and Snacks:  Continue current diet. Nutrition Supplement Therapy:   Medical food supplement therapy:  Initiate Ensure Enlive three times per day (this provides 350 kcal and 20 grams protein per bottle)   Medical food supplement therapy:  Initiate Magic Cup twice per day (this provides 290 kcal and 9 grams protein per serving)     Malnutrition Assessment:  Malnutrition Status: Severe malnutrition  Context: Chronic illness  Findings of clinical characteristics of malnutrition:   Energy Intake:  Mild decrease in energy intake (specify) (pt reported declined intake/daisha for ~2 months)  Weight Loss:   (pt claims 20# wt loss (~15%) last 6 months, limited wt hx)     Body Fat Loss:  7 - Severe body fat loss, Buccal region,Fat overlying ribs,Orbital,Triceps   Muscle Mass Loss:  7 - Severe muscle mass loss, Clavicles (pectoralis &deltoids),Hand (interosseous),Scapula (trapezius),Temples (temporalis)  Fluid Accumulation:  No significant fluid accumulation,     Strength:  Not performed     Nutrition Assessment:   Nutrition History: Pt reports consuming more snacks than meals for the last 2 months d/t early satiety and declined appetite. Pt eats 1 meal + 3 small snacks + 2 boosts per day at baseline. Pt reports UBW of ~150 lbs last weighed 6-8 months ago. Nutrition Background: PMH: Lung Ca (last chemo in January), HLD, abdominal aneurysm, COPD, persistent PNA w/ pleural effusion. Pt presents to ED with worsening SOB x 2 weeks, CP x 3 weeks radiating to back. 4/13: CT reveals PNA. Nutrition Interval:  Pt seen today during breathing treatment. Pt reports no c/o n/v or constipation/diarrhea.  Pt reports appetite and intake has greatly improved since admission. Pt claims to have consumed ~50% of all meals provided since admission. Pt questioned of desire to receive ONS with meals. Pt agreeable to Ensure Enlive and [employer]. Pt received thoracentesis today with 500mL fluid removed. Nutrition Related Findings:   NFPE detailed above. 4/13: SLP assessment reveals mild oral dysphagia r/t missing dentition - Easy to chew / thin liq      Current Nutrition Therapies:  ADULT DIET Easy to Chew    Current Intake:   Average Meal Intake: 51-75%        Anthropometric Measures:  Height: 5' 8\" (172.7 cm)  Current Body Wt: 57.8 kg (127 lb 6.8 oz), Weight source: Not specified  BMI: 19.4, Underweight (BMI less than 22) age over 72  Admission Body Weight: 127 lb 6.8 oz (4/12)  Ideal Body Weight (lbs) (Calculated): 154 lbs (70 kg), 82.7 %  Usual Body Wt: 68 kg (150 lb) (pt stated), Percent weight change: -15        Edema: No data recorded  Estimated Daily Nutrient Needs:  Energy (kcal/day): 9986-8713 (Kcal/kg (25-30), Weight Used: Current (57.8 kg))  Protein (g/day): 69-81 (1.2-1.4 g/kg) Weight Used: (Current (57.8 kg))  Fluid (ml/day):   (1 ml/kcal)    Nutrition Diagnosis:   · Inadequate protein-energy intake related to catabolic illness,increased demand for energy/nutrients,early satiety as evidenced by weight loss,severe loss of subcutaneous fat,severe muscle loss (Lung cancer)    · Severe malnutrition,In context of chronic illness related to catabolic illness,early satiety as evidenced by poor intake prior to admission,severe loss of subcutaneous fat,severe muscle loss    Nutrition Interventions:   Food and/or Nutrient Delivery: Continue current diet,Start oral nutrition supplement     Coordination of Nutrition Care: Continue to monitor while inpatient     POC discussed during IDR. Goals: Active Goal: Meet >75% of needs po by next f/u.     Nutrition Monitoring and Evaluation:      Food/Nutrient Intake Outcomes: Food and nutrient intake,Supplement intake  Physical Signs/Symptoms Outcomes: Biochemical data,Chewing or swallowing,Weight    Discharge Planning:     Too soon to determine    Yolette Chandler Dietetic Intern

## 2022-04-14 NOTE — PROGRESS NOTES
Pt is resting quietly in bed. Respirations even and unlabored on 3L NC. No signs of distress noted at this time. Call light within reach. Will continue to monitor.

## 2022-04-14 NOTE — PROGRESS NOTES
PULMONARY PROGRESS NOTE           2022    Jose Angel Brown                        Date of Admission:  2022    The patient's chart is reviewed and the patient is discussed with the staff. Patient is a 80 y.o.  male, PMH  COPD, HLD, stage III adenocarcinoma of left lung, BPH, and AAA, seen and evaluated at the request of Dr. Esme Le for pleural effusion. The patient presented to the ED overnight after being experiencing worsening shortness of breath for the past three weeks. The patient is seen by Dr. Dann Branch in [facility] for COPD and Dr. Reggie Moran in [facility] for non small cell lung cancer, DELORES, adenocarcinoma, Stage IIA by EBUS, stage IIIA by PET scan ([id number]), not a candidate for resection, s/p single modality radiation. The patient had been previously treated for possible radiation pneumonitis with prednisone and Augmentin but has continued to have worsening cough and shortness of breath. The patient was brought to the ED by his stepson who was concerned about the patient's worsening lethargy as the patient is normally as active as possible at home. On admission WBC 15.7, albumin 2.3, Trop 14.9, lactic acid 1.6, CT chest revealed left pleural effusion, airspace consolidation of left lung, and emphysematous changes    Subjective:     Patient seen sitting up in bed today eating breakfast  Feels \"better\" and \"ready to go home\"  Coughing up some mucus but no blood      Review of Systems  A comprehensive review of systems was negative except for that written in the HPI. Prior to Admission Medications   Prescriptions Last Dose Informant Patient Reported? Taking? albuterol (PROVENTIL HFA, VENTOLIN HFA, PROAIR HFA) 90 mcg/actuation inhaler Unknown at Unknown time  No No   Si-4 puffs before exercise and every 3-6 hrs as needed. alfuzosin SR (UROXATRAL) 10 mg SR tablet 2022 at Unknown time  Yes Yes   Sig: Take  by mouth daily.    amoxicillin (AMOXIL) 500 mg capsule Unknown at Unknown time  No No   Sig: Take 1 Cap by mouth two (2) times a day. aspirin delayed-release 81 mg tablet Unknown at Unknown time  Yes No   Sig: Take  by mouth daily. ipratropium (ATROVENT) 0.06 % nasal spray Unknown at Unknown time  No No   Si Sprays by Both Nostrils route four (4) times daily. Indications: PERENNIAL ALLERGIC RHINITIS   levocetirizine (XYZAL) 5 mg tablet 2022 at Unknown time  No Yes   Sig: Take 1 Tab by mouth daily. montelukast (SINGULAIR) 10 mg tablet 2022 at Unknown time  Yes Yes   Sig: Take 10 mg by mouth daily. pantoprazole (PROTONIX) 40 mg tablet 2022 at Unknown time  No Yes   Sig: Take 1 Tab by mouth daily. Indications: GASTROESOPHAGEAL REFLUX   predniSONE (DELTASONE) 10 mg tablet 2022 at Unknown time  Yes Yes   Sig: Take 40 mg/kg by mouth daily (with breakfast). sildenafil citrate (VIAGRA) 100 mg tablet Unknown at Unknown time  No No   Sig: Take 1 Tab by mouth as needed. tamsulosin (FLOMAX) 0.4 mg capsule Unknown at Unknown time  No No   Sig: Take 1 Cap by mouth daily.       Facility-Administered Medications: None     Past Medical History:   Diagnosis Date    Abdominal aneurysm without mention of rupture 2015    Actinic keratosis     Adenocarcinoma of lung, stage 3, left ([facility]     Benign localized hyperplasia of prostate with urinary obstruction and other lower urinary tract symptoms (LUTS)(600.21) 2015    Cervicalgia     Chronic airway obstruction, not elsewhere classified 2015    Cramp of limb 2015    Esophageal reflux     Impotence of organic origin 2015    Kyphosis (acquired) (postural) 2015    Other symptoms involving respiratory system and chest 2015    Other voice and resonance disorders     Pure hypercholesterolemia 2015    Unspecified constipation     Unspecified disorders of arteries and arterioles 2015    Unspecified hemorrhoids without mention of complication   Unspecified tinnitus 2015    Urinary frequency      Past Surgical History:   Procedure Laterality Date    HX CARPAL TUNNEL RELEASE Bilateral     HX HERNIA REPAIR      HIATAL2011    HX OTHER SURGICAL      AORTIC ANEURSYM STENT PLACEMENT- DR BENAVIDES    HX TONSILLECTOMY      HX TOTAL COLECTOMY      DR BENAVIDES     Social History     Socioeconomic History    Marital status:      Spouse name: Not on file    Number of children: Not on file    Years of education: Not on file    Highest education level: Not on file   Occupational History    Not on file   Tobacco Use    Smoking status: Former Smoker     Quit date: 2014     Years since quittin.0    Smokeless tobacco: Not on file   Substance and Sexual Activity    Alcohol use: No    Drug use: Not on file    Sexual activity: Not on file   Other Topics Concern    Not on file   Social History Narrative    Not on file     Social Determinants of Health     Financial Resource Strain:     Difficulty of Paying Living Expenses: Not on file   Food Insecurity:     Worried About [address] in the Last Year: Not on file    [address] in the Last Year: Not on file   Transportation Needs:     Lack of Transportation (Medical): Not on file    Lack of Transportation (Non-Medical):  Not on file   Physical Activity:     Days of Exercise per Week: Not on file    Minutes of Exercise per Session: Not on file   Stress:     Feeling of Stress : Not on file   Social Connections:     Frequency of Communication with Friends and Family: Not on file    Frequency of Social Gatherings with Friends and Family: Not on file    Attends Hinduism Services: Not on file    Active Member of Clubs or Organizations: Not on file    Attends Club or Organization Meetings: Not on file    Marital Status: Not on file   Intimate Partner Violence:     Fear of Current or Ex-Partner: Not on file    Emotionally Abused: Not on file    Physically Abused: Not on file    Sexually Abused: Not on file   Housing Stability:     Unable to Pay for Housing in the Last Year: Not on file    Number of Places Lived in the Last Year: Not on file    Unstable Housing in the Last Year: Not on file     Family History   Problem Relation Age of Onset    Elevated Lipids Mother     No Known Problems Sister     Other Sister         STOMACH PROBLEMS    Heart Disease Father 79        MI     Allergies   Allergen Reactions    Actonel [Risedronate] Nausea Only     Current Facility-Administered Medications   Medication Dose Route Frequency    tamsulosin (FLOMAX) capsule 0.4 mg  0.4 mg Oral DAILY    pantoprazole (PROTONIX) tablet 40 mg  40 mg Oral DAILY    aspirin delayed-release tablet 81 mg  81 mg Oral DAILY    acetaminophen (TYLENOL) tablet 650 mg  650 mg Oral Q6H PRN    Or    acetaminophen (TYLENOL) suppository 650 mg  650 mg Rectal Q6H PRN    polyethylene glycol (MIRALAX) packet 17 g  17 g Oral DAILY PRN    ondansetron (ZOFRAN ODT) tablet 4 mg  4 mg Oral Q8H PRN    Or    ondansetron (ZOFRAN) injection 4 mg  4 mg IntraVENous Q6H PRN    enoxaparin (LOVENOX) injection 40 mg  40 mg SubCUTAneous DAILY    0.9% sodium chloride infusion  75 mL/hr IntraVENous CONTINUOUS    tuberculin injection 5 Units  5 Units IntraDERMal ONCE    piperacillin-tazobactam (ZOSYN) 3.375 g in 0.9% sodium chloride (MBP/ADV) 100 mL MBP  3.375 g IntraVENous Q8H    albuterol (PROVENTIL VENTOLIN) nebulizer solution 2.5 mg  2.5 mg Nebulization Q4H PRN    levalbuterol (XOPENEX) nebulizer soln 0.63 mg/3 mL  0.63 mg Nebulization QID RT    sodium chloride 3% hypertonic nebulizer soln  4 mL Nebulization BID RT    sodium chloride (NS) flush 5-10 mL  5-10 mL IntraVENous Q8H    sodium chloride (NS) flush 5-10 mL  5-10 mL IntraVENous PRN     Objective:   Blood pressure (!) 107/90, pulse 65, temperature 97.6 °F (36.4 °C), resp. rate 21, height 5' 8\" (1.727 m), weight 127 lb 8 oz (57.8 kg), SpO2 98 %. Intake/Output Summary (Last 24 hours) at 4/14/2022 0853  Last data filed at 4/13/2022 1335  Gross per 24 hour   Intake 600 ml   Output    Net 600 ml     PHYSICAL EXAM   Constitutional:  the patient is thin and frail and in no acute distress  EENMT:  Sclera clear, pupils equal, oral mucosa moist  Respiratory: On 3L O2 NC, decreased left, no wheezing  Cardiovascular:  RRR without M,G,R  Gastrointestinal: soft and non-tender; with positive bowel sounds. Musculoskeletal: warm without cyanosis. There is no lower extremity edema. Skin:  no jaundice or rashes, no wounds   Neurologic: no gross neuro deficits     Psychiatric:  alert and oriented x 3    CXR:  4/12/22      CT Chest:4/12/22      Recent Labs     04/14/22  0356 04/12/22 1819 04/12/22 1631 04/12/22  1542   WBC 10.5  --   --  15.7*   HGB 9.5*  --   --  12.4*   HCT 32.6*  --   --  41.8     --   --  370   LAC  --   --  1.6  --    NA  --   --   --  141   K  --   --   --  4.3   CL  --   --   --  102   CO2  --   --   --  32   GLU  --   --   --  122*   BUN  --   --   --  28*   CREA  --   --   --  0.90   MG  --   --   --  2.3   CA  --   --   --  10.5*   ALB  --   --   --  2.3*   AST  --   --   --  19   ALT  --   --   --  61   AP  --   --   --  91   TROPHS  --  17.1*  --  14.9*     ECHO: No results found for this or any previous visit.      Results     Procedure Component Value Units Date/Time    CULTURE, RESPIRATORY/SPUTUM/BRONCH Chris Leonard [[id number]]     Order Status: Sent Specimen: Sputum     CULTURE, BLOOD [[id number]] Collected: 04/12/22 1819    Order Status: Completed Specimen: Blood Updated: 04/13/22 0847     Special Requests: --        LEFT  ARM       Culture result: NO GROWTH AFTER 13 HOURS       CULTURE, BLOOD [[id number]] Collected: 04/12/22 1631    Order Status: Completed Specimen: Blood Updated: 04/13/22 0847     Special Requests: --        LEFT  Antecubital       Culture result: NO GROWTH AFTER 14 HOURS           Inpat Anti-Infectives (From admission, onward)     Start     Ordered Stop    04/13/22 0500  piperacillin-tazobactam (ZOSYN) 3.375 g in 0.9% sodium chloride (MBP/ADV) 100 mL MBP  3.375 g,   IntraVENous,   EVERY 8 HOURS         04/13/22 0420 04/18/22 0459              Assessment and Plan:  (Medical Decision Making)   Principal Problem:    Pneumonia due to gram-negative bacteria ([facility] (4/13/2022)  --On Zosyn day 2, blood cultures pending, will continue    Active Problems:    COPD (chronic obstructive pulmonary disease) ([facility] (1/21/2015)  --Xopenex here, only on albuterol at home. Will continue 3% saline nebs and mucolytics      Adenocarcinoma of left lung, stage 3 ([facility] (4/13/2022)  --Followed by Dr. Kelin Glynn in [facility], s/p single modality radiation      Pleural effusion (4/13/2022)  --Per CT chest, pneumonia, continue antibiotics, holding off on bronch for now      Dehydration (4/13/2022)  --Likely due to poor oral intake      Weakness generalized (4/13/2022)  --Palliative Care following     DNR    More than 50% of the time documented was spent in face-to-face contact with the patient and in the care of the patient on the floor/unit where the patient is located. Ketty Murphy NP     I have spoken with and examined the patient. I agree with the above assessment and plan as documented.     Gen: awake on 2L NC  Lungs:  coarse  Heart:  RRR with no Murmur/Rubs/Gallops  Abd:soft Nt  Ext: no LE edema    Will check US today ,continue ABX, WBC normal now, he feels better      Kevin Goldberg, MD

## 2022-04-14 NOTE — PROCEDURES
PROCEDURE:    DIAGNOSTIC/THERAPEUTIC THORACENTESIS        PRE-OP DIAGNOSIS:     R PLEURAL EFFUSION    POST-OP DIAGNOSIS:    R PLEURAL EFFUSION      ANESTHESIA:    LOCAL ANESTHESIA WITH 1% LIDOCAINE 10 CC TOTAL. CHEST ULTRASOUND FINDINGS:    A Turbo-M, Sonosite ultrasound with a 5-16 mHz probe was used to image the chest and localize the pleural effusion on the Right chest.    A  moderateanechoic space was seen on the Right consistent with an uncomplicated pleural effusion. DESCRIPTION OF PROCEDURE:    After obtaining informed consent and localizing the safest location for thoracentesis, the   9 intercostal space was marked with a blunt, plastic needle cap in the mid scapular line. An Wengo AK-0100 Pleral-Seal thoracentesis kit was used to perform the procedure. The skin was  cleansed with the supplied  chlorhexididne swab and then draped in the usual fasion. Using the previously marked location as a giude, a 22 G 1.5 inch needle was used to inject 10 cc of 1% lidocaine into the skin and subcutaneous tissue, as well as onto the underlying rib and inter-costal muscles, pleural fluid was aspirated to assure proper location, prior to removing the anesthesia needle. A 3mm  incision was then made, with the supplied scalpel in the usual fashion to facilitate the insertiopn of the thoracentesis needle. The needle with an 8French thoracentesis catheter was then introduced into the chest through the previously made incision in the usual fashion, the rib localized with the needle, and the catheter then marched over the rib into the pleural space. After aspirating fluid, the thoracentesis catheter was then placed into the chest using the needle itself as a trocar. The needle was then removed and the catheter was attached to the supplied tubing without complication. 600 cc of slightly turbid  fluid, was aspirated and sent for analysis.         Fluid was sent for the following tests:    Cell count with differential  LDH  Glucose  Total protein  Cytology  AFB  Fungus  Routine culture and Gram stain    Post procedure US confirmed complete of the effusion.     EBL:     1 drop       COMPLICATIONS:  Michael Henriquez MD

## 2022-04-14 NOTE — PROGRESS NOTES
Pt resting in bed comfortably at this time, alert and oriented times 4. No distress noted, respirations even and unlabored on 2 L NC. Pt denies pain at this time. Pt instructed to call for assistance if needed, call light in place, will continue to monitor. Bed alarm on.

## 2022-04-14 NOTE — PROGRESS NOTES
Advance Care Planning   Advance Care Planning Inpatient Note  Ara [facility] Care Department    Today's Date: 4/14/2022  Unit: [facility] 8 MED SURG    Received request from Health Care provider. Upon review of chart and communication with care team, patient's decision making abilities are not in question. Patient and Friends was/were present in the room during visit. Goals of ACP Conversation:  Discuss Advance Care planning documents  Facilitate a discussion related to patient's goals of care as they align with the patient's values and beliefs    Health Care Decision Makers:    No healthcare decision makers have been documented. Click here to complete [employer] including selection of the Healthcare Decision Maker Relationship (ie \"Primary\")    Summary:  No Decision Maker named by patient at this time    Advance Care Planning Documents (Patient Wishes) on file:  None     Assessment:     Patient states that he has HCPOA documents at home, and does not want to file new documents.  advised patient to have a copy brought to hospital to place in chart.     Interventions:  Requested patient/family submit existing document(s) for our records: [address] of /Advance Directive appointment of Health care agent    Care Preferences Communicated:  No    Outcomes/Plan:  Teach Back Method used to verify the patient/Healthcare Decision Maker's understanding of key information in the advance directive documents    Chaplain Natalia on 4/14/2022 at 1:24 PM

## 2022-04-14 NOTE — PROGRESS NOTES
Pt sat up on side of bed for thoracentesis. Consent obtained. Time out performed. Pts vitals monitored throughout procedure. Left ultrasound done and pic taken of pleural fluid.  ~500 ml yellow pleural fluid from L. Pt tolerated procedure well with no adverse rxn. Specimens sent to the lab x 3 and labeled appropriately. Site dressed appropriately and report given to pts RN.    Lung sliding done and ultrasound findings reviewed by MD.

## 2022-04-15 PROBLEM — E43 SEVERE PROTEIN-CALORIE MALNUTRITION (HCC): Status: ACTIVE | Noted: 2022-04-15

## 2022-04-15 LAB
MM INDURATION POC: 0 MM (ref 0–5)
PPD POC: NEGATIVE NEGATIVE

## 2022-04-15 PROCEDURE — 74011000258 HC RX REV CODE- 258: Performed by: HOSPITALIST

## 2022-04-15 PROCEDURE — 74011636637 HC RX REV CODE- 636/637: Performed by: HOSPITALIST

## 2022-04-15 PROCEDURE — 94762 N-INVAS EAR/PLS OXIMTRY CONT: CPT

## 2022-04-15 PROCEDURE — 77010033678 HC OXYGEN DAILY

## 2022-04-15 PROCEDURE — 74011250636 HC RX REV CODE- 250/636: Performed by: HOSPITALIST

## 2022-04-15 PROCEDURE — 74011250637 HC RX REV CODE- 250/637: Performed by: HOSPITALIST

## 2022-04-15 PROCEDURE — 74011000250 HC RX REV CODE- 250: Performed by: HOSPITALIST

## 2022-04-15 PROCEDURE — 74011000250 HC RX REV CODE- 250: Performed by: EMERGENCY MEDICINE

## 2022-04-15 PROCEDURE — 65270000029 HC RM PRIVATE

## 2022-04-15 PROCEDURE — 94640 AIRWAY INHALATION TREATMENT: CPT

## 2022-04-15 PROCEDURE — 97530 THERAPEUTIC ACTIVITIES: CPT

## 2022-04-15 PROCEDURE — 99232 SBSQ HOSP IP/OBS MODERATE 35: CPT | Performed by: INTERNAL MEDICINE

## 2022-04-15 PROCEDURE — 74011000250 HC RX REV CODE- 250: Performed by: INTERNAL MEDICINE

## 2022-04-15 RX ADMIN — ASPIRIN 81 MG: 81 TABLET ORAL at 09:35

## 2022-04-15 RX ADMIN — SODIUM CHLORIDE, PRESERVATIVE FREE 10 ML: 5 INJECTION INTRAVENOUS at 21:45

## 2022-04-15 RX ADMIN — PREDNISONE 20 MG: 20 TABLET ORAL at 09:35

## 2022-04-15 RX ADMIN — SODIUM CHLORIDE, PRESERVATIVE FREE 10 ML: 5 INJECTION INTRAVENOUS at 05:50

## 2022-04-15 RX ADMIN — SODIUM CHLORIDE SOLN NEBU 3% 4 ML: 3 NEBU SOLN at 07:13

## 2022-04-15 RX ADMIN — PIPERACILLIN AND TAZOBACTAM 3.38 G: 3; .375 INJECTION, POWDER, LYOPHILIZED, FOR SOLUTION INTRAVENOUS at 21:44

## 2022-04-15 RX ADMIN — ENOXAPARIN SODIUM 40 MG: 40 INJECTION SUBCUTANEOUS at 09:38

## 2022-04-15 RX ADMIN — TAMSULOSIN HYDROCHLORIDE 0.4 MG: 0.4 CAPSULE ORAL at 09:35

## 2022-04-15 RX ADMIN — PIPERACILLIN AND TAZOBACTAM 3.38 G: 3; .375 INJECTION, POWDER, LYOPHILIZED, FOR SOLUTION INTRAVENOUS at 05:46

## 2022-04-15 RX ADMIN — LEVALBUTEROL HYDROCHLORIDE 0.63 MG: 0.63 SOLUTION RESPIRATORY (INHALATION) at 11:30

## 2022-04-15 RX ADMIN — SODIUM CHLORIDE, PRESERVATIVE FREE 5 ML: 5 INJECTION INTRAVENOUS at 13:53

## 2022-04-15 RX ADMIN — LEVALBUTEROL HYDROCHLORIDE 0.63 MG: 0.63 SOLUTION RESPIRATORY (INHALATION) at 15:26

## 2022-04-15 RX ADMIN — LEVALBUTEROL HYDROCHLORIDE 0.63 MG: 0.63 SOLUTION RESPIRATORY (INHALATION) at 07:13

## 2022-04-15 RX ADMIN — SODIUM CHLORIDE SOLN NEBU 3% 4 ML: 3 NEBU SOLN at 20:14

## 2022-04-15 RX ADMIN — MIDODRINE HYDROCHLORIDE 10 MG: 5 TABLET ORAL at 13:53

## 2022-04-15 RX ADMIN — PANTOPRAZOLE SODIUM 40 MG: 40 TABLET, DELAYED RELEASE ORAL at 09:35

## 2022-04-15 RX ADMIN — MIDODRINE HYDROCHLORIDE 10 MG: 5 TABLET ORAL at 17:43

## 2022-04-15 RX ADMIN — PIPERACILLIN AND TAZOBACTAM 3.38 G: 3; .375 INJECTION, POWDER, LYOPHILIZED, FOR SOLUTION INTRAVENOUS at 13:53

## 2022-04-15 RX ADMIN — MIDODRINE HYDROCHLORIDE 10 MG: 5 TABLET ORAL at 09:35

## 2022-04-15 RX ADMIN — LEVALBUTEROL HYDROCHLORIDE 0.63 MG: 0.63 SOLUTION RESPIRATORY (INHALATION) at 20:14

## 2022-04-15 NOTE — PROGRESS NOTES
PULMONARY PROGRESS NOTE           4/15/2022    Hortensia Brown                        Date of Admission:  2022    The patient's chart is reviewed and the patient is discussed with the staff. Patient is a 80 y.o.  male, PMH  COPD, HLD, stage III adenocarcinoma of left lung, BPH, and AAA, seen and evaluated at the request of Dr. Saskia Magaña for pleural effusion. The patient presented to the ED overnight after being experiencing worsening shortness of breath for the past three weeks. The patient is seen by Dr. Dinh Hernandez in [facility] for COPD and Dr. Compa Mejía in [facility] for non small cell lung cancer, DELORES, adenocarcinoma, Stage IIA by EBUS, stage IIIA by PET scan ([id number]), not a candidate for resection, s/p single modality radiation. The patient had been previously treated for possible radiation pneumonitis with prednisone and Augmentin but has continued to have worsening cough and shortness of breath. The patient was brought to the ED by his stepson who was concerned about the patient's worsening lethargy as the patient is normally as active as possible at home. On admission WBC 15.7, albumin 2.3, Trop 14.9, lactic acid 1.6, CT chest revealed left pleural effusion, airspace consolidation of left lung, and emphysematous changes    Subjective:   Feeling better after thoracentesis  On 3L NC  Needs STR      Review of Systems  A comprehensive review of systems was negative except for that written in the HPI. Prior to Admission Medications   Prescriptions Last Dose Informant Patient Reported? Taking? albuterol (PROVENTIL HFA, VENTOLIN HFA, PROAIR HFA) 90 mcg/actuation inhaler Unknown at Unknown time  No No   Si-4 puffs before exercise and every 3-6 hrs as needed. alfuzosin SR (UROXATRAL) 10 mg SR tablet 2022 at Unknown time  Yes Yes   Sig: Take  by mouth daily. amoxicillin (AMOXIL) 500 mg capsule Unknown at Unknown time  No No   Sig: Take 1 Cap by mouth two (2) times a day. aspirin delayed-release 81 mg tablet Unknown at Unknown time  Yes No   Sig: Take  by mouth daily. ipratropium (ATROVENT) 0.06 % nasal spray Unknown at Unknown time  No No   Si Sprays by Both Nostrils route four (4) times daily. Indications: PERENNIAL ALLERGIC RHINITIS   levocetirizine (XYZAL) 5 mg tablet 2022 at Unknown time  No Yes   Sig: Take 1 Tab by mouth daily. montelukast (SINGULAIR) 10 mg tablet 2022 at Unknown time  Yes Yes   Sig: Take 10 mg by mouth daily. pantoprazole (PROTONIX) 40 mg tablet 2022 at Unknown time  No Yes   Sig: Take 1 Tab by mouth daily. Indications: GASTROESOPHAGEAL REFLUX   predniSONE (DELTASONE) 10 mg tablet 2022 at Unknown time  Yes Yes   Sig: Take 40 mg/kg by mouth daily (with breakfast). sildenafil citrate (VIAGRA) 100 mg tablet Unknown at Unknown time  No No   Sig: Take 1 Tab by mouth as needed. tamsulosin (FLOMAX) 0.4 mg capsule Unknown at Unknown time  No No   Sig: Take 1 Cap by mouth daily.       Facility-Administered Medications: None     Past Medical History:   Diagnosis Date    Abdominal aneurysm without mention of rupture 2015    Actinic keratosis     Adenocarcinoma of lung, stage 3, left ([facility]     Benign localized hyperplasia of prostate with urinary obstruction and other lower urinary tract symptoms (LUTS)(600.21) 2015    Cervicalgia     Chronic airway obstruction, not elsewhere classified 2015    Cramp of limb 2015    Esophageal reflux     Impotence of organic origin 2015    Kyphosis (acquired) (postural) 2015    Other symptoms involving respiratory system and chest 2015    Other voice and resonance disorders     Pure hypercholesterolemia 2015    Unspecified constipation     Unspecified disorders of arteries and arterioles 2015    Unspecified hemorrhoids without mention of complication 9570    Unspecified tinnitus 2015    Urinary frequency      Past Surgical History:   Procedure Laterality Date    HX CARPAL TUNNEL RELEASE Bilateral     HX HERNIA REPAIR      HIATAL2011    HX OTHER SURGICAL      AORTIC ANEURSYM STENT PLACEMENT- DR BENAVIDES    HX TONSILLECTOMY      HX TOTAL COLECTOMY       [id number]     Social History     Socioeconomic History    Marital status:      Spouse name: Not on file    Number of children: Not on file    Years of education: Not on file    Highest education level: Not on file   Occupational History    Not on file   Tobacco Use    Smoking status: Former Smoker     Quit date: 2014     Years since quittin.0    Smokeless tobacco: Not on file   Substance and Sexual Activity    Alcohol use: No    Drug use: Not on file    Sexual activity: Not on file   Other Topics Concern    Not on file   Social History Narrative    Not on file     Social Determinants of Health     Financial Resource Strain:     Difficulty of Paying Living Expenses: Not on file   Food Insecurity:     Worried About [address] [employer] in the Last Year: Not on file    [place] of Food in the Last Year: Not on file   Transportation Needs:     Lack of Transportation (Medical): Not on file    Lack of Transportation (Non-Medical):  Not on file   Physical Activity:     Days of Exercise per Week: Not on file    Minutes of Exercise per Session: Not on file   Stress:     Feeling of Stress : Not on file   Social Connections:     Frequency of Communication with Friends and Family: Not on file    Frequency of Social Gatherings with Friends and Family: Not on file    Attends Mandaen Services: Not on file    Active Member of Clubs or Organizations: Not on file    Attends Club or Organization Meetings: Not on file    Marital Status: Not on file   Intimate Partner Violence:     Fear of Current or Ex-Partner: Not on file    Emotionally Abused: Not on file    Physically Abused: Not on file    Sexually Abused: Not on file   Housing Stability:     Unable to Pay for Housing in the Last Year: Not on file    Number of Places Lived in the Last Year: Not on file    Unstable Housing in the Last Year: Not on file     Family History   Problem Relation Age of Onset    Elevated Lipids Mother     No Known Problems Sister     Other Sister         STOMACH PROBLEMS    Heart Disease Father 79        MI     Allergies   Allergen Reactions    Actonel [Risedronate] Nausea Only     Current Facility-Administered Medications   Medication Dose Route Frequency    midodrine (PROAMATINE) tablet 10 mg  10 mg Oral TID WITH MEALS    predniSONE (DELTASONE) tablet 20 mg  20 mg Oral DAILY WITH BREAKFAST    tamsulosin (FLOMAX) capsule 0.4 mg  0.4 mg Oral DAILY    pantoprazole (PROTONIX) tablet 40 mg  40 mg Oral DAILY    aspirin delayed-release tablet 81 mg  81 mg Oral DAILY    acetaminophen (TYLENOL) tablet 650 mg  650 mg Oral Q6H PRN    Or    acetaminophen (TYLENOL) suppository 650 mg  650 mg Rectal Q6H PRN    polyethylene glycol (MIRALAX) packet 17 g  17 g Oral DAILY PRN    ondansetron (ZOFRAN ODT) tablet 4 mg  4 mg Oral Q8H PRN    Or    ondansetron (ZOFRAN) injection 4 mg  4 mg IntraVENous Q6H PRN    enoxaparin (LOVENOX) injection 40 mg  40 mg SubCUTAneous DAILY    piperacillin-tazobactam (ZOSYN) 3.375 g in 0.9% sodium chloride (MBP/ADV) 100 mL MBP  3.375 g IntraVENous Q8H    albuterol (PROVENTIL VENTOLIN) nebulizer solution 2.5 mg  2.5 mg Nebulization Q4H PRN    levalbuterol (XOPENEX) nebulizer soln 0.63 mg/3 mL  0.63 mg Nebulization QID RT    sodium chloride 3% hypertonic nebulizer soln  4 mL Nebulization BID RT    sodium chloride (NS) flush 5-10 mL  5-10 mL IntraVENous Q8H    sodium chloride (NS) flush 5-10 mL  5-10 mL IntraVENous PRN     Objective:   Blood pressure 126/67, pulse 69, temperature 97.9 °F (36.6 °C), resp. rate 20, height 5' 8\" (1.727 m), weight 127 lb 8 oz (57.8 kg), SpO2 98 %.      Intake/Output Summary (Last 24 hours) at 4/15/2022 0911  Last data filed at 4/15/2022 9133  Gross per 24 hour   Intake 675 ml   Output 1000 ml   Net -325 ml     PHYSICAL EXAM   Constitutional:  the patient is thin and frail and in no acute distress  EENMT:  Sclera clear, pupils equal, oral mucosa moist  Respiratory: On 3L O2 NC, decreased left, no wheezing  Cardiovascular:  RRR without M,G,R  Gastrointestinal: soft and non-tender; with positive bowel sounds. Musculoskeletal: warm without cyanosis. There is no lower extremity edema. Skin:  no jaundice or rashes, no wounds   Neurologic: no gross neuro deficits     Psychiatric:  alert and oriented x 3    CXR:  4/12/22      CT Chest:4/12/22      Recent Labs     04/14/22  0356 04/12/22  1819 04/12/22  1631 04/12/22  1542   WBC 10.5  --   --  15.7*   HGB 9.5*  --   --  12.4*   HCT 32.6*  --   --  41.8     --   --  370   LAC  --   --  1.6  --    NA  --   --   --  141   K  --   --   --  4.3   CL  --   --   --  102   CO2  --   --   --  32   GLU  --   --   --  122*   BUN  --   --   --  28*   CREA  --   --   --  0.90   MG  --   --   --  2.3   CA  --   --   --  10.5*   ALB  --   --   --  2.3*   AST  --   --   --  19   ALT  --   --   --  61   AP  --   --   --  91   TROPHS  --  17.1*  --  14.9*     ECHO: No results found for this or any previous visit.      Results     Procedure Component Value Units Date/Time    CULTURE, BODY FLUID [facility] STAIN [[id number]] Collected: 04/14/22 1005    Order Status: Completed Specimen: Pleural Fluid Updated: 04/15/22 0650     Special Requests: --        LEFT  1       GRAM STAIN PENDING     Culture result: NO GROWTH 1 DAY       FUNGUS CULTURE AND SMEAR [[id number]] Collected: 04/14/22 1005    Order Status: Completed Updated: 04/14/22 1042    AFB CULTURE + SMEAR W/RFLX ID FROM CULTURE [[id number]] Collected: 04/14/22 1005    Order Status: Completed Updated: 04/14/22 1043    CULTURE, RESPIRATORY/SPUTUM/BRONCH [facility] STAIN [[id number]]     Order Status: Sent Specimen: Sputum     CULTURE, BLOOD [[id number]] Collected: 04/12/22 1819    Order Status: Completed Specimen: Blood Updated: 04/15/22 0722     Special Requests: --        LEFT  ARM       Culture result: NO GROWTH 3 DAYS       CULTURE, BLOOD [[id number]] Collected: 04/12/22 1631    Order Status: Completed Specimen: Blood Updated: 04/15/22 0722     Special Requests: --        LEFT  Antecubital       Culture result: NO GROWTH 3 DAYS           Inpat Anti-Infectives (From admission, onward)     Start     Ordered Stop    04/13/22 0500  piperacillin-tazobactam (ZOSYN) 3.375 g in 0.9% sodium chloride (MBP/ADV) 100 mL MBP  3.375 g,   IntraVENous,   EVERY 8 HOURS         04/13/22 0420 04/18/22 0453              Assessment and Plan:  (Medical Decision Making)   Principal Problem:    [facility]neum[facility] gram-negative bacteria [facility] (4/13/2022)  --On Zosyn day 2, blood cultures pending, will continue    Active Problems:    COPD (chronic obstructive pulmonary disease) ([facility] [address] (1/21/2015)  --Xopenex here, only on albuterol at home. Will continue 3% saline nebs and mucolytics      Adenocarcinoma of left lung, stage 3 ([facility] [address] (4/13/2022)  --Followed by Dr. Teressa Chiang in [place], s/p single modality radiation      Pleural effusion (4/13/2022)  --Per CT chest, pneumonia, continue antibiotics, holding off on bronch for now      Dehydration (4/13/2022)  --Likely due to poor oral intake      Weakness generalized (4/13/2022)  --Palliative Care following     DNR      Stable  After thoracentesis, needs 7 days of ABX, needs follow up CT scan -is followed in [facility] to [employer]  Will see back on Monday if still here      More than 50% of the time documented was spent in face-to-face contact with the patient and in the care of the patient on the floor/unit where the patient is located.         Keri Tong MD

## 2022-04-15 NOTE — PROGRESS NOTES
Patient resting in bed, alert and oriented, cooperative with care. Patient on 3 liters of Oxygen via N/C. Patient denies pain or distress, safety measures in place, call light within reach.

## 2022-04-15 NOTE — PROGRESS NOTES
[facility] Hospitalist Progress Note     Name:  Kirby Brown  Age:86 y.o. Sex:male   :  1935    MRN:  [id number]     Admit Date:  2022    Reason for Admission:  Pneumonia [J18.9]    Hospital Course/Interval history:     Patient is an 80-year-old male with history of stage III adenocarcinoma status post radiation, COPD, HLD, BPH, AAA admitted on  with acute on chronic hypoxic respiratory failure in view of left sided pleural effusion and gram-negative bacterial pneumonia with left lung consolidation. Patient was empirically started on Zosyn for gram-negative and anaerobic coverage. Bronchodilators have been initiated      Assessment & Plan     Acute on chronic respiratory failure// Pneumonia due to gram-negative bacteria // Left pleural effusion:  4/15:  Patient is currently on 3 L via NC, at baseline. No worsening noted. Titrate for SPO2 greater than 92%. Zosyn 3.375 g IV every 8 hours, EOT   Appr status post thoracentesis on  with removal of 600 cc of exudative fluid  Xopenex nebs 4 times daily with saline nebs. Prednisone 20 mg p.o. daily from 4/15. Generalized weakness:  Continue PT and OT  Possible plan for short-term rehab. Dehydration:  Resolved    Hx of stage 3 adenocarcinoma of left lung: In remission following radiation therapy. We will follow-up with cytology from pleural fluid collected on . BPH with LUTS:  Continue Flomax, no concerns for urinary retention. Diet:  DIET ADULT  DIET ADULT ORAL NUTRITION SUPPLEMENT  DIET ADULT ORAL NUTRITION SUPPLEMENT  DVT PPx: Lovenox  Code status: DNR  Disposition/Expected LOS: Possible discharge to short-term rehab in the next 2 to 3 days if medically stable. Subjective (04/15/22): Patient seen at bedside, resting in bed comfortably. He is currently on 3 L via NC which is his baseline, denies any worsening respiratory complaints including shortness of breath or cough.   Patient is asking for assistance for his diapers to be changed this morning. Denies any chest pain, fever, chills, nausea vomiting or diarrhea. Review of Systems: 14 point review of systems is otherwise negative with the exception of the elements mentioned above. Objective:     Patient Vitals for the past 24 hrs:   Temp Pulse Resp BP SpO2   04/15/22 0718 97.9 °F (36.6 °C) 69 20 126/67 98 %   04/15/22 0700     98 %   04/15/22 0233 97.9 °F (36.6 °C) 70 18 112/63 98 %   04/14/22 2201 98 °F (36.7 °C) 73 18 (!) 115/59 97 %   04/14/22 2006     98 %   04/14/22 1921 98.2 °F (36.8 °C) 75 20 (!) 110/59 99 %   04/14/22 1535  78  (!) 108/52 99 %   04/14/22 1513     99 %   04/14/22 1452 98.5 °F (36.9 °C) 75 21 (!) 94/49 99 %   04/14/22 1221 98 °F (36.7 °C) 83  91/69    04/14/22 1130     98 %   04/14/22 1129     98 %   04/14/22 1034  77 20 (!) 88/61 93 %   04/14/22 1009  67 20 (!) 113/54 98 %   04/14/22 1008  69 20 (!) 94/46 100 %   04/14/22 1004  68 20 (!) 82/50 95 %   04/14/22 1001  69 20 (!) 87/35 94 %     Oxygen Therapy  O2 Sat (%): 98 % (04/15/22 0718)  Pulse via Oximetry: 70 beats per minute (04/15/22 0700)  O2 Device: Nasal cannula (04/15/22 0700)  Skin Assessment: Clean, dry, & intact (04/14/22 1130)  Skin Protection for O2 Device: No (04/13/22 0805)  O2 Flow Rate (L/min): 3 l/min (04/15/22 0700)  FIO2 (%): 32 % (04/13/22 2043)    Body mass index is 19.39 kg/m².     Physical Exam:   General:  Alert, awake, frail, elderly, on 3 L BNC  Lungs:  Clear breath sounds on auscultation, mild crackles bilaterally mostly on the left side  CV:  Regular rate and rhythm with normal S1 and S2   Abdomen:  Soft, nontender, nondistended, normoactive bowel sounds   Extremities:  No cyanosis clubbing or edema   Neuro:              GCS 15, cranial nerves intact, no motor or sensory deficit  Psych:  AOx3, mood and affect flat    Data Review:  I have reviewed all labs, meds, and studies from the last 24 hours:    Labs:    Recent Results (from the past 24 hour(s))   PLEASE READ & DOCUMENT PPD TEST IN 24 HRS    Collection Time: 04/14/22  9:31 AM   Result Value Ref Range    PPD Negative Negative    mm Induration     CELL COUNT, BODY FLUID    Collection Time: 04/14/22 10:05 AM   Result Value Ref Range    BODY FLUID TYPE LEFT PLEURAL FLUID 2     FLUID COLOR IRAM      FLUID APPEARANCE HAZY      FLUID RBC CT. 8,000 /cu mm    FLUID WBC COUNT 962 /cu mm    BRCH NEUTROPHIL 13 %    BRCH LYMPHS 80 %    BRCH MACROPHAGES 7 %    OTHER CELL 7     GLUCOSE, FLUID    Collection Time: 04/14/22 10:05 AM   Result Value Ref Range    Fluid Type: LEFT PLEURAL FLUID 2     Glucose, body fld. 80 MG/DL   LDH, BODY FLUID    Collection Time: 04/14/22 10:05 AM   Result Value Ref Range    Fluid Type: LEFT PLEURAL FLUID 2     LD, body fld. 114 U/L   PROTEIN TOTAL, FLUID    Collection Time: 04/14/22 10:05 AM   Result Value Ref Range    Fluid Type: LEFT PLEURAL FLUID 2     Protein total, body fld.  2.6 g/dL   CULTURE, BODY FLUID Floretta Amite STAIN    Collection Time: 04/14/22 10:05 AM    Specimen: Pleural Fluid   Result Value Ref Range    Special Requests: LEFT  1        GRAM STAIN PENDING     Culture result: NO GROWTH 1 DAY         All Micro Results     Procedure Component Value Units Date/Time    CULTURE, BLOOD [[id number]] Collected: 04/12/22 1631    Order Status: Completed Specimen: Blood Updated: 04/15/22 0722     Special Requests: --        LEFT  Antecubital       Culture result: NO GROWTH 3 DAYS       CULTURE, BLOOD [[id number]] Collected: 04/12/22 1819    Order Status: Completed Specimen: Blood Updated: 04/15/22 0722     Special Requests: --        LEFT  ARM       Culture result: NO GROWTH 3 DAYS       CULTURE, BODY FLUID W Zak Harman [[id number]] Collected: 04/14/22 1005    Order Status: Completed Specimen: Pleural Fluid Updated: 04/15/22 0650     Special Requests: --        LEFT  1       GRAM STAIN PENDING     Culture result: NO GROWTH 1 DAY       AFB CULTURE + SMEAR W/RFLX ID FROM CULTURE [[id number]] Collected: 04/14/22 1005    Order Status: Completed Updated: 04/14/22 1043    FUNGUS CULTURE AND SMEAR [[id number]] Collected: 04/14/22 1005    Order Status: Completed Updated: 04/14/22 1042    CULTURE, RESPIRATORY/SPUTUM/BRONCH Lucia Mile STAIN [[id number]]     Order Status: Sent Specimen: Sputum           EKG Results     Procedure 720 Value Units Date/Time    EKG [[id number]] Collected: 04/12/22 1533    Order Status: Completed Updated: 04/12/22 2031     Ventricular Rate 96 BPM      Atrial Rate 96 BPM      P-R Interval 119 ms      QRS Duration 83 ms      Q-T Interval 338 ms      QTC Calculation (Bezet) 428 ms      Calculated P Axis 97 degrees      Calculated R Axis -61 degrees      Calculated T Axis 93 degrees      Diagnosis --     Sinus rhythm  Borderline short SC interval  Abnormal R-wave progression, late transition  Inferior infarct, old      Confirmed by ST RALF SABA MD (), PRACHI MCCRARY ([id number]) on 4/12/2022 8:31:27 PM      EKG, 12 LEAD, INITIAL [[id number]]     Order Status: Canceled           Other Studies:  No results found.     Current Meds:   Current Facility-Administered Medications   Medication Dose Route Frequency    midodrine (PROAMATINE) tablet 10 mg  10 mg Oral TID WITH MEALS    predniSONE (DELTASONE) tablet 20 mg  20 mg Oral DAILY WITH BREAKFAST    tamsulosin (FLOMAX) capsule 0.4 mg  0.4 mg Oral DAILY    pantoprazole (PROTONIX) tablet 40 mg  40 mg Oral DAILY    aspirin delayed-release tablet 81 mg  81 mg Oral DAILY    acetaminophen (TYLENOL) tablet 650 mg  650 mg Oral Q6H PRN    Or    acetaminophen (TYLENOL) suppository 650 mg  650 mg Rectal Q6H PRN    polyethylene glycol (MIRALAX) packet 17 g  17 g Oral DAILY PRN    ondansetron (ZOFRAN ODT) tablet 4 mg  4 mg Oral Q8H PRN    Or    ondansetron (ZOFRAN) injection 4 mg  4 mg IntraVENous Q6H PRN    enoxaparin (LOVENOX) injection 40 mg  40 mg SubCUTAneous DAILY    piperacillin-tazobactam (ZOSYN) 3.375 g in 0.9% sodium chloride (MBP/ADV) 100 mL MBP  3.375 g IntraVENous Q8H    albuterol (PROVENTIL VENTOLIN) nebulizer solution 2.5 mg  2.5 mg Nebulization Q4H PRN    levalbuterol (XOPENEX) nebulizer soln 0.63 mg/3 mL  0.63 mg Nebulization QID RT    sodium chloride 3% hypertonic nebulizer soln  4 mL Nebulization BID RT    sodium chloride (NS) flush 5-10 mL  5-10 mL IntraVENous Q8H    sodium chloride (NS) flush 5-10 mL  5-10 mL IntraVENous PRN       Problem List:  Hospital Problems as of 4/15/2022 Date Reviewed: 4/13/2022          Codes Class Noted - Resolved POA    Adenocarcinoma of left lung, stage 3 (HCC) ICD-10-CM: C34.92  ICD-9-CM: 162.9  4/13/2022 - Present Yes        * (Principal) Pneumonia due to gram-negative bacteria ([facility] ICD-10-CM: J15.6  ICD-9-CM: 482.83  4/13/2022 - Present Yes        Pleural effusion ICD-10-CM: J90  ICD-9-CM: 511.9  4/13/2022 - Present Yes        Dehydration ICD-10-CM: E86.0  ICD-9-CM: 276.51  4/13/2022 - Present Yes        Weakness generalized ICD-10-CM: R53.1  ICD-9-CM: 780.79  4/13/2022 - Present Yes        COPD (chronic obstructive pulmonary disease) ([facility] ICD-10-CM: J44.9  ICD-9-CM: 690  1/21/2015 - Present Yes                   Part of this note was written by using a voice dictation software and the note has been proof read but may still contain some grammatical/other typographical errors.     Signed By: Karma Schirmer, MD   [employer] [facility]    April 15, 2022  5:15 PM

## 2022-04-15 NOTE — PROGRESS NOTES
Physician Progress Note      Kofi Beltran  [facility] #:                  [id number]  :                       1935  ADMIT DATE:       2022 3:22 PM  [address] [place] DATE:  RESPONDING  PROVIDER #:        Seth Green MD          QUERY TEXT:    Patient admitted with PNA. Noted documentation of acute on chronic respiratory failure in progress note on 22. In order to support the diagnosis of acute respiratory failure, please include additional clinical indicators in your documentation. Or please document if the diagnosis of acute respiratory failure has been ruled out after further study. The medical record reflects the following:  Risk Factors: history of chronic respiratory failure  Clinical Indicators: \"Lungs: Clear to auscultation bilaterally\", respirations 16-20  Treatment: 02 @ 3-4 L N/C    Acute Respiratory Failure Clinical Indicators per 3[facility] MS-DRG Training Guide and Quick Reference Guide:  pO2 < 60 mmHg or SpO2 (pulse oximetry) < 91% breathing room air  pCO2 > 50 and pH < 7.35  P/F ratio (pO2 / FIO2) < 300  pO2 decrease or pCO2 increase by 10 mmHg from baseline (if known)  Supplemental oxygen of 40% or more  Presence of respiratory distress, tachypnea, dyspnea, shortness of breath, wheezing  Unable to speak in complete sentences  Use of accessory muscles to breathe  Extreme anxiety and feeling of impending doom  Tripod position  Confusion/altered mental status/obtunded  Options provided:  -- Acute Respiratory Failure as evidenced by, Please document evidence.   -- Acute Respiratory Failure ruled out after study  -- Acute Respiratory Failure ruled out after study and Chronic Respiratory Failure confirmed  -- Other - I will add my own diagnosis  -- Disagree - Not applicable / Not valid  -- Disagree - Clinically unable to determine / Unknown  -- Refer to Clinical Documentation Reviewer    PROVIDER RESPONSE TEXT:    Acute Respiratory Failure ruled out after study and Chronic Respiratory Failure confirmed.     Query created by: Kelsey Carter on 4/15/2022 11:51 AM      Electronically signed by:  Adams Grier MD 4/15/2022 11:54 AM

## 2022-04-15 NOTE — PROGRESS NOTES
MSN, CM:  Spoke with patient about PT/OT recommendations for SNF. Patient is acceptable to [facility] therapy unit. Information given to [facility]. Awaiting approval for facility. Case Management will continue to follow.

## 2022-04-15 NOTE — PROGRESS NOTES
ACUTE PHYSICAL THERAPY GOALS:  (Developed with and agreed upon by patient and/or caregiver. )  LTG:  (1.)Mr. Karen Abraham will move from supine to sit and sit to supine , scoot up and down and roll side to side in bed with INDEPENDENCE within 7 treatment day(s). (2.)Mr. Karen Abraham will transfer from bed to chair and chair to bed with MODIFIED INDEPENDENCE using the least restrictive device within 7 treatment day(s). (3.)Mr. Karen Abraham will ambulate with SUPERVISION for a minimum of 150 feet with the least restrictive device within 7 treatment day(s). ________________________________________________________________________________________________      PHYSICAL THERAPY: Daily Note and AM Treatment Day # 2    Katerin Argentina Abraham is a 80 y.o. male   PRIMARY DIAGNOSIS: Pneumonia due to gram-negative bacteria ([facility] [address]  Pneumonia [J18.9]  Procedure(s) (LRB):  THORACENTESIS (Left)  ULTRASOUND (Bilateral)  1 Day Post-Op    ASSESSMENT:     REHAB RECOMMENDATIONS: CURRENT LEVEL OF FUNCTION:  (Most Recently Demonstrated)   Recommendation to date pending progress:  Setting:  Short-term Rehab vs 24/7 care at home  Equipment:   Rolling Walker Bed Mobility:  Modified Independent  Sit to Stand:  Contact Guard Assistance  Transfers:  Contact Guard Assistance  Gait/Mobility:  Contact Guard Assistance     ASSESSMENT:  Mr. Karen Abraham presents supine in bed upon entering room and is agreeable to therapy session. Pt with significant improvements in activity tolerance and mobility this date. He was able to ambulate 250' with use of a RW, VC for increased safety and proximity to RW, on 3L with SpO2 at 97% after ambulation. He needed CGA-min A for sit to stands as well as to stand for urinating. PT will continue to address decreased strength, stability and overall activity tolerance.       SUBJECTIVE:   Mr. Karen Abraham states, \"[place]\"    SOCIAL HISTORY/ LIVING ENVIRONMENT: refer to eval  Home Environment: Trailer/mobile home  # Steps to Enter: 0 (ramp)  One/Two Story Residence: One story  Living Alone: Yes  Support Systems: Child(meir)  OBJECTIVE:     PAIN: VITAL SIGNS: LINES/DRAINS:   Pre Treatment: Pain Screen  Pain Scale 1: Numeric (0 - 10)  Pain Intensity 1: 0  Post Treatment: 0 Vital Signs  O2 Sat (%): 97 %  O2 Device: Nasal cannula  O2 Flow Rate (L/min): 3 l/min none  O2 Device: Nasal cannula     MOBILITY: I Mod I S SBA CGA Min Mod Max Total  NT x2 Comments:   Bed Mobility    Rolling [] [] [] [] [] [] [] [] [] [] []    Supine to Sit [] [] [] [] [] [] [] [] [] [] []    Scooting [] [] [] [] [] [] [] [] [] [] []    Sit to Supine [] [] [] [] [] [] [] [] [] [] []    Transfers    Sit to Stand [] [] [] [] [] [] [] [] [] [] []    Bed to Chair [] [] [] [] [] [] [] [] [] [] []    Stand to Sit [] [] [] [] [] [] [] [] [] [] []    I=Independent, Mod I=Modified Independent, S=Supervision, SBA=Standby Assistance, CGA=Contact Guard Assistance,   Min=Minimal Assistance, Mod=Moderate Assistance, Max=Maximal Assistance, Total=Total Assistance, NT=Not Tested    BALANCE: Good Fair+ Fair Fair- Poor NT Comments   Sitting Static [] [] [] [] [] []    Sitting Dynamic [] [] [] [] [] []              Standing Static [] [] [] [] [] []    Standing Dynamic [] [] [] [] [] []      GAIT: I Mod I S SBA CGA Min Mod Max Total  NT x2 Comments:   Level of Assistance [] [] [] [] [x] [] [] [] [] [] []    Distance 250'    DME Rolling Walker    Gait Quality Forward flexion, decreased gait speed, decreased step clearance    Weightbearing  Status N/A     I=Independent, Mod I=Modified Independent, S=Supervision, SBA=Standby Assistance, CGA=Contact Guard Assistance,   Min=Minimal Assistance, Mod=Moderate Assistance, Max=Maximal Assistance, Total=Total Assistance, NT=Not Tested    PLAN:   FREQUENCY/DURATION: PT Plan of Care: 3 times/week for duration of hospital stay or until stated goals are met, whichever comes first.  TREATMENT:     TREATMENT:   ($$ Therapeutic Activity: 23-37 mins )  Therapeutic Activity (23 Minutes): Therapeutic activity included Rolling, Supine to Sit, Sit to Supine, Scooting, Transfer Training, Ambulation on level ground, Sitting balance , and Standing balance to improve functional Mobility, Strength, and Activity tolerance.     TREATMENT GRID:  N/A    AFTER TREATMENT POSITION/PRECAUTIONS:  Bed, Needs within reach, and Visitors at bedside    INTERDISCIPLINARY COLLABORATION:  RN/PCT and PT/PTA    TOTAL TREATMENT DURATION:  PT Patient Time In/Time Out  Time In: 1009  Time Out: [address] [place], [place]

## 2022-04-16 VITALS
HEIGHT: 68 IN | BODY MASS INDEX: 19.32 KG/M2 | RESPIRATION RATE: 20 BRPM | HEART RATE: 80 BPM | SYSTOLIC BLOOD PRESSURE: 117 MMHG | OXYGEN SATURATION: 97 % | TEMPERATURE: 98.2 F | WEIGHT: 127.5 LBS | DIASTOLIC BLOOD PRESSURE: 54 MMHG

## 2022-04-16 LAB
BACTERIA SPEC CULT: NORMAL
GRAM STN SPEC: NORMAL
GRAM STN SPEC: NORMAL
SERVICE CMNT-IMP: NORMAL

## 2022-04-16 PROCEDURE — 94762 N-INVAS EAR/PLS OXIMTRY CONT: CPT

## 2022-04-16 PROCEDURE — 74011250637 HC RX REV CODE- 250/637: Performed by: HOSPITALIST

## 2022-04-16 PROCEDURE — 74011000250 HC RX REV CODE- 250: Performed by: HOSPITALIST

## 2022-04-16 PROCEDURE — 74011000250 HC RX REV CODE- 250: Performed by: EMERGENCY MEDICINE

## 2022-04-16 PROCEDURE — 74011250636 HC RX REV CODE- 250/636: Performed by: HOSPITALIST

## 2022-04-16 PROCEDURE — 74011000258 HC RX REV CODE- 258: Performed by: HOSPITALIST

## 2022-04-16 PROCEDURE — 74011000250 HC RX REV CODE- 250: Performed by: INTERNAL MEDICINE

## 2022-04-16 PROCEDURE — 77010033678 HC OXYGEN DAILY

## 2022-04-16 PROCEDURE — 74011636637 HC RX REV CODE- 636/637: Performed by: HOSPITALIST

## 2022-04-16 PROCEDURE — 94640 AIRWAY INHALATION TREATMENT: CPT

## 2022-04-16 RX ORDER — LEVOFLOXACIN 750 MG/1
750 TABLET ORAL DAILY
Qty: 5 TABLET | Refills: 0 | Status: SHIPPED
Start: 2022-04-16 | End: 2022-04-21

## 2022-04-16 RX ORDER — PREDNISONE 20 MG/1
TABLET ORAL
Qty: 30 TABLET | Refills: 0 | Status: SHIPPED
Start: 2022-04-16

## 2022-04-16 RX ORDER — MIDODRINE HYDROCHLORIDE 5 MG/1
5 TABLET ORAL
Qty: 21 TABLET | Refills: 0 | Status: SHIPPED
Start: 2022-04-16 | End: 2022-04-23

## 2022-04-16 RX ORDER — GUAIFENESIN 600 MG/1
600 TABLET, EXTENDED RELEASE ORAL 2 TIMES DAILY
Qty: 28 TABLET | Refills: 0 | Status: SHIPPED
Start: 2022-04-16 | End: 2022-04-30

## 2022-04-16 RX ORDER — SAME BUTANEDISULFONATE/BETAINE 400-600 MG
500 POWDER IN PACKET (EA) ORAL 2 TIMES DAILY
Status: DISCONTINUED | OUTPATIENT
Start: 2022-04-16 | End: 2022-04-16 | Stop reason: HOSPADM

## 2022-04-16 RX ORDER — SAME BUTANEDISULFONATE/BETAINE 400-600 MG
500 POWDER IN PACKET (EA) ORAL 2 TIMES DAILY
Qty: 28 CAPSULE | Refills: 0 | Status: SHIPPED
Start: 2022-04-16 | End: 2022-04-23

## 2022-04-16 RX ORDER — ALBUTEROL SULFATE 90 UG/1
2 AEROSOL, METERED RESPIRATORY (INHALATION)
Qty: 6.7 G | Refills: 2 | Status: SHIPPED
Start: 2022-04-16

## 2022-04-16 RX ORDER — AMOXICILLIN AND CLAVULANATE POTASSIUM 875; 125 MG/1; MG/1
1 TABLET, FILM COATED ORAL EVERY 12 HOURS
Qty: 14 TABLET | Refills: 0 | Status: SHIPPED
Start: 2022-04-16 | End: 2022-04-23

## 2022-04-16 RX ORDER — IPRATROPIUM BROMIDE AND ALBUTEROL SULFATE 2.5; .5 MG/3ML; MG/3ML
3 SOLUTION RESPIRATORY (INHALATION)
Qty: 30 NEBULE | Refills: 1 | Status: SHIPPED
Start: 2022-04-16

## 2022-04-16 RX ADMIN — PIPERACILLIN AND TAZOBACTAM 3.38 G: 3; .375 INJECTION, POWDER, LYOPHILIZED, FOR SOLUTION INTRAVENOUS at 12:03

## 2022-04-16 RX ADMIN — PANTOPRAZOLE SODIUM 40 MG: 40 TABLET, DELAYED RELEASE ORAL at 08:32

## 2022-04-16 RX ADMIN — ASPIRIN 81 MG: 81 TABLET ORAL at 08:32

## 2022-04-16 RX ADMIN — SODIUM CHLORIDE SOLN NEBU 3% 4 ML: 3 NEBU SOLN at 07:42

## 2022-04-16 RX ADMIN — LEVALBUTEROL HYDROCHLORIDE 0.63 MG: 0.63 SOLUTION RESPIRATORY (INHALATION) at 11:31

## 2022-04-16 RX ADMIN — SODIUM CHLORIDE, PRESERVATIVE FREE 5 ML: 5 INJECTION INTRAVENOUS at 13:17

## 2022-04-16 RX ADMIN — MIDODRINE HYDROCHLORIDE 10 MG: 5 TABLET ORAL at 08:32

## 2022-04-16 RX ADMIN — LEVALBUTEROL HYDROCHLORIDE 0.63 MG: 0.63 SOLUTION RESPIRATORY (INHALATION) at 07:42

## 2022-04-16 RX ADMIN — PREDNISONE 20 MG: 20 TABLET ORAL at 08:32

## 2022-04-16 RX ADMIN — Medication 500 MG: at 11:50

## 2022-04-16 RX ADMIN — ENOXAPARIN SODIUM 40 MG: 40 INJECTION SUBCUTANEOUS at 08:33

## 2022-04-16 RX ADMIN — PIPERACILLIN AND TAZOBACTAM 3.38 G: 3; .375 INJECTION, POWDER, LYOPHILIZED, FOR SOLUTION INTRAVENOUS at 05:12

## 2022-04-16 RX ADMIN — SODIUM CHLORIDE, PRESERVATIVE FREE 10 ML: 5 INJECTION INTRAVENOUS at 05:12

## 2022-04-16 RX ADMIN — MIDODRINE HYDROCHLORIDE 10 MG: 5 TABLET ORAL at 11:50

## 2022-04-16 RX ADMIN — TAMSULOSIN HYDROCHLORIDE 0.4 MG: 0.4 CAPSULE ORAL at 08:32

## 2022-04-16 NOTE — PROGRESS NOTES
Patient were discussed in [id number] team meeting this morning. Patient has d/c orders for today. CM receved a call from [facility] stating that patient has been accepted and approved for [facility] today. Family at bedside and agreeable to the d/c. Room# 103D/ Report # [phone]. [employer] will transport patient at 3:30pm.  Patient has met all treatment goals / milestones. CM will continue to monitor and remain available for any needs or concerns that may occur. Care Management Interventions  PCP Verified by CM: Yes (Dr. Nelli Hoffman)  Mode of Transport at Discharge:  Other (see comment) (family to transport)  Transition of Care Consult (CM Consult): Acute Rehab ([facility] in [place])  Discharge Durable Medical Equipment: No  Health Maintenance Reviewed: Yes  Physical Therapy Consult: Yes  Occupational Therapy Consult: Yes  Speech Therapy Consult: No  Support Systems: Child(meir)  Confirm Follow Up Transport: Family  The Patient and/or Patient Representative was Provided with a Choice of Provider and Agrees with the Discharge Plan?: Yes  Freedom of Choice List was Provided with Basic Dialogue that Supports the Patient's Individualized Plan of Care/Goals, Treatment Preferences and Shares the Quality Data Associated with the Providers?: Yes  [place] Resource Information Provided?: No  Discharge Location  Patient Expects to be Discharged to[de-identified] Rehab Unit Subacute

## 2022-04-16 NOTE — DISCHARGE SUMMARY
Hospitalist Discharge Summary     Patient ID:  Idalia Sacks. Corbin Hails  [id number]  60 y.o.  1935  Admit date: 4/12/2022  3:22 PM  Discharge date and time: 4/16/2022  Attending: Jeanna Person MD  PCP:  Norvell Goodell., MD  Treatment Team: Attending Provider: Jeanna Person MD; Respiratory Therapist: Kev Kaiser RT; Consulting Provider: Will Foley MD; Hospitalist: Jeanna Person MD; Consulting Provider: Graydon Kayser, MD; Utilization Review: Chantelle Zhou RN; Care Manager: Elijah Mendez RN    Principal Diagnosis Pneumonia due to gram-negative bacteria [facility])   Principal Problem:    Pneumonia due to gram-negative bacteria ([facility] [address] (4/13/2022)    Active Problems:    COPD (chronic obstructive pulmonary disease) ([facility] [address] (1/21/2015)      Adenocarcinoma of left lung, stage 3 ([facility] [address] (4/13/2022)      Pleural effusion (4/13/2022)      Dehydration (4/13/2022)      Weakness generalized (4/13/2022)      Severe protein-calorie malnutrition ([facility] [address] (4/15/2022)       Patient is an 80-year-old male with history of stage III adenocarcinoma status post radiation, COPD, HLD, BPH, AAA admitted on 4/13 with acute on chronic hypoxic respiratory failure in view of left sided pleural effusion and gram-negative bacterial pneumonia with left lung consolidation.  Patient was empirically started on Zosyn for gram-negative and anaerobic coverage. Bronchodilators have been initiated      Hospital Course:  Acute on chronic respiratory failure// Pneumonia due to gram-negative bacteria // Left pleural effusion:  4/16:  Patient is currently on 3 L via NC, at baseline. No worsening noted. Titrate for SPO2 greater than 92%.   Pt to be discharged on Augmentin x 7 days and Levaquin x 5 days  Appr status post thoracentesis on 4/14 with removal of 600 cc of exudative fluid  Prednisone taper on discharge     Generalized weakness:  Continue PT and OT  short-term rehab.     Dehydration:  Resolved     Hx of stage 3 adenocarcinoma of left lung: In remission following radiation therapy. Follow-up with cytology from pleural fluid collected on 4/14.        BPH with LUTS:  Continue Flomax, no concerns for urinary retention. Dispo: pt is medically cleared and hemodynamically stable for discharge. Significant Diagnostic Studies:   History: History of lung cancer, abdominal aneurysm, reflux, shortness of breath  and hypoxia. Pneumonitis.     EXAM: CT chest with IV contrast     TECHNIQUE: Thin section axial CT images are obtained from the thoracic inlet  through the upper abdomen after the uneventful administration of 100 cc  Isovue-370. Radiation dose reduction techniques were used for this study. Our  CT scanners use one or all of the following: Automated exposure control,  adjustment of the mA and/or kV according to patient size, use of iterative  reconstruction.     No comparison     FINDINGS: There is a left pleural effusion with significant volume loss on the  left and abnormal airspace opacity. There is leftward mediastinal shift. There  is abnormal fluid/debris filling the left main bronchus. Emphysematous change  present within the right lung with hyperexpansion of the lung. No suspicious  pulmonary nodules.     Evaluation of the upper abdomen demonstrates evidence of remote granulomatous  disease with calcified granulomas present.     Bone window evaluation demonstrates no aggressive osseous lesions.     IMPRESSION  1. Left pleural effusion. 2. Airspace consolidation of the left lung with some degree of left sided  atelectasis. There is debris/fluid within the left main bronchus. Given the  patient's clinical history, note that recurrent malignancy cannot be excluded on  this exam. Recommend correlation with prior studies for further evaluation. If  no prior studies are available, either short-term interval follow-up or PET/CT  would be recommended.   3. Emphysematous change present in the right lung with hyperexpansion. 4. Evidence of remote granulomatous disease. Portable chest x-ray     CLINICAL INDICATION: Shortness of breath     FINDINGS: Single AP view the chest submitted without comparison. There is near  complete whiteout of the left hemithorax with shift of the mediastinum and  cardiac silhouette into the left hemithorax. There is hyperexpansion of the  right lung parenchyma. There is concern for a right lateral pneumothorax. Otherwise, there is no airspace consolidation in the right lung. The bones are  osteopenic.     IMPRESSION  1. Hyperexpansion of the right lung with concern for a right lateral  pneumothorax. Recommend performing a left lateral decubitus chest x-ray if the  patient is unable to be completely upright. 2. Complete whiteout of the left hemithorax most likely representing atelectasis  and a probable component of pneumonia and pleural effusion. Labs: Results:       Chemistry No results for input(s): GLU, NA, K, CL, CO2, BUN, CREA, CA, AGAP, BUCR, TBIL, AP, TP, ALB, GLOB, AGRAT in the last 72 hours. No lab exists for component: GPT   CBC w/Diff Recent Labs     04/14/22  0356   WBC 10.5   RBC 3.82*   HGB 9.5*   HCT 32.6*         Cardiac Enzymes No results for input(s): CPK, CKND1, ANNIE in the last 72 hours. No lab exists for component: CKRMB, TROIP   Coagulation No results for input(s): PTP, INR, APTT, INREXT in the last 72 hours. Lipid Panel No results found for: CHOL, CHOLPOCT, CHOLX, CHLST, CHOLV, 211704, HDL, HDLP, LDL, LDLC, DLDLP, 265545, VLDLC, VLDL, TGLX, TRIGL, TRIGP, TGLPOCT, CHHD, CHHDX   BNP No results for input(s): BNPP in the last 72 hours. Liver Enzymes No results for input(s): TP, ALB, TBIL, AP in the last 72 hours.     No lab exists for component: SGOT, GPT, DBIL   Thyroid Studies No results found for: T4, T3U, TSH, TSHEXT         Discharge Exam:  Visit Vitals  /66   Pulse 73   Temp 97.7 °F (36.5 °C)   Resp 18   Ht 5' 8\" (1.727 m)   Wt 57.8 kg (127 lb 8 oz)   SpO2 94%   BMI 19.39 kg/m²     General appearance: alert, awake, elderly, on 3 lt via NC  Lungs: clear to auscultation bilaterally with mild crackles in left lung base  Heart: regular rate and rhythm, S1, S2 normal, no murmur, click, rub or gallop  Abdomen: soft, non-tender. Bowel sounds normal. No masses,  no organomegaly  Extremities: no cyanosis or edema  Neurologic: Grossly normal    Disposition: SNF  Discharge Condition: stable  Patient Instructions:   Current Discharge Medication List      START taking these medications    Details   midodrine (PROAMATINE) 5 mg tablet Take 1 Tablet by mouth three (3) times daily (with meals) for 7 days. Qty: 21 Tablet, Refills: 0  Start date: 4/16/2022, End date: 4/23/2022      albuterol-ipratropium (DUO-NEB) 2.5 mg-0.5 mg/3 ml nebu 3 mL by Nebulization route every four (4) hours as needed for Wheezing, Shortness of Breath or Respiratory Distress. Qty: 30 Nebule, Refills: 1  Start date: 4/16/2022      guaiFENesin ER (MUCINEX) 600 mg ER tablet Take 1 Tablet by mouth two (2) times a day for 14 days. Qty: 28 Tablet, Refills: 0  Start date: 4/16/2022, End date: 4/30/2022         CONTINUE these medications which have CHANGED    Details   albuterol (PROVENTIL HFA, VENTOLIN HFA, PROAIR HFA) 90 mcg/actuation inhaler Take 2 Puffs by inhalation every four (4) hours as needed for Wheezing. 1-4 puffs before exercise and every 3-6 hrs as needed. Qty: 6.7 g, Refills: 2  Start date: 4/16/2022    Comments: Ventolin is preferred by insurance  Associated Diagnoses: COPD (chronic obstructive pulmonary disease) (HCC)      predniSONE (DELTASONE) 20 mg tablet 40 mg po daily x 4 days, 20 mg po daily x 3 days, 10 mg po daily indefinitely until seen by Pulmonology. Qty: 30 Tablet, Refills: 0  Start date: 4/16/2022         CONTINUE these medications which have NOT CHANGED    Details   alfuzosin SR (UROXATRAL) 10 mg SR tablet Take  by mouth daily.       montelukast (SINGULAIR) 10 mg tablet Take 10 mg by mouth daily. levocetirizine (XYZAL) 5 mg tablet Take 1 Tab by mouth daily. Qty: 30 Tab, Refills: 5    Associated Diagnoses: Chronic rhinitis      pantoprazole (PROTONIX) 40 mg tablet Take 1 Tab by mouth daily. Indications: GASTROESOPHAGEAL REFLUX  Qty: 90 Tab, Refills: 1    Comments: Take instead of Dexilant, not in addition to [address]. Associated Diagnoses: Gastroesophageal reflux disease without esophagitis      tamsulosin (FLOMAX) 0.4 mg capsule Take 1 Cap by mouth daily. Qty: 90 Cap, Refills: 1    Comments: Adding refills. Does not need now. aspirin delayed-release 81 mg tablet Take  by mouth daily.          STOP taking these medications       amoxicillin (AMOXIL) 500 mg capsule Comments:   Reason for Stopping:         ipratropium (ATROVENT) 0.06 % nasal spray Comments:   Reason for Stopping:         sildenafil citrate (VIAGRA) 100 mg tablet Comments:   Reason for Stopping:               Activity: PT/OT Eval and Treat  Diet: easy to chew regular diet  Wound Care: None needed    Follow-up  ·  follow-up with [facility] in 2 weeks   Time spent to discharge patient 35 minutes  Signed:  Srinivasan Johnson MD  4/16/2022  7:37 AM

## 2022-04-16 NOTE — PROGRESS NOTES
Report given to receiving nurse at [facility] at [phone]. EMS to deliver discharge instructions to facility. Instructed to call for any questions. appt 4/11.

## 2022-04-17 LAB
BACTERIA SPEC CULT: NORMAL
BACTERIA SPEC CULT: NORMAL
SERVICE CMNT-IMP: NORMAL
SERVICE CMNT-IMP: NORMAL

## 2022-05-05 NOTE — PROGRESS NOTES
Physician Progress Note      PATIENT:               Haseeb Cazares  CSN #:                  [id number]  :                       1935  ADMIT DATE:       2022 3:22 PM  [address] [facility] [place] DATE:        2022 3:45 PM  RESPONDING  PROVIDER #:        Arron Madden MD          QUERY TEXT:    Pt admitted with GNR PNA and noted to have a wbc of 15.7 and pulse of 109 on admission. If possible, please document in the progress notes and discharge summary if you are evaluating and /or treating any of the following: The medical record reflects the following:  Risk Factors: GNR PNA, lung cancer. ? malignant pleural effusion. COPD, severe PCM  Clinical Indicators: cxray--pna, pulse 109, wbc 15.7, worsening SOB,  Treatment: thoracentesis, iv zosyn, bronchodilators, labs, cxray pulm consult, palliative consult. Options provided:  -- Sepsis, present on admission  -- Sepsis, present on admission now resolved  -- Sepsis, not present on admission  -- GNR PNA without Sepsis  -- Other - I will add my own diagnosis  -- Disagree - Not applicable / Not valid  -- Disagree - Clinically unable to determine / Unknown  -- Refer to Clinical Documentation Reviewer    PROVIDER RESPONSE TEXT:    This patient has sepsis that was not present on admission.     Query created by: Iván Carmen on 2022 8:41 AM      Electronically signed by:  Arron Madden MD 2022 7:02 AM

## 2022-05-13 LAB
FUNGUS CULTURE, RFCO2T: NORMAL
FUNGUS SMEAR, RFCO1T: NORMAL
FUNGUS SPEC CULT: NORMAL
FUNGUS STAIN, 188244: NORMAL
REFLEX TO ID, RFCO3T: NORMAL
SPECIMEN SOURCE: NORMAL
SPECIMEN SOURCE: NORMAL

## 2022-05-28 LAB
ACID FAST STN SPEC: NEGATIVE
MYCOBACTERIUM SPEC QL CULT: NEGATIVE
SPECIMEN PREPARATION: NORMAL
SPECIMEN SOURCE: NORMAL

## 2023-05-21 NOTE — PROGRESS NOTES
Assumed pt care. Pt arrived via stretcher and was able to walk to bed with assistance. Pt is weak. Pt on 3L NC (also wears 3L at home) with stable respirations. Pt's stepson at bedside able to answer admission questions and PTA meds - Sonny Mabry has a bag with pt's meds. . Pt is alert and oriented with periodic confusion noted. NS infusion has been discontinued per order. Call light in reach. Safety measures in place. No apparent distress noted at this time. Marcel Platt(Resident)

## (undated) DEVICE — STERILE POLYISOPRENE POWDER-FREE SURGICAL GLOVES: Brand: PROTEXIS

## (undated) DEVICE — KIT THORCENT 8FR L5IN POLYUR W/ 18/22/25GA NDL 3 W STPCOCK

## (undated) DEVICE — GEL US 5L JUG